# Patient Record
Sex: MALE | Race: WHITE | Employment: UNEMPLOYED | URBAN - METROPOLITAN AREA
[De-identification: names, ages, dates, MRNs, and addresses within clinical notes are randomized per-mention and may not be internally consistent; named-entity substitution may affect disease eponyms.]

---

## 2017-01-14 ENCOUNTER — OFFICE VISIT (OUTPATIENT)
Dept: INTERNAL MEDICINE CLINIC | Facility: CLINIC | Age: 52
End: 2017-01-14

## 2017-01-14 ENCOUNTER — LAB ENCOUNTER (OUTPATIENT)
Dept: LAB | Age: 52
End: 2017-01-14
Attending: FAMILY MEDICINE
Payer: COMMERCIAL

## 2017-01-14 VITALS
BODY MASS INDEX: 26.86 KG/M2 | HEIGHT: 75 IN | SYSTOLIC BLOOD PRESSURE: 136 MMHG | HEART RATE: 82 BPM | RESPIRATION RATE: 16 BRPM | WEIGHT: 216 LBS | TEMPERATURE: 98 F | OXYGEN SATURATION: 97 % | DIASTOLIC BLOOD PRESSURE: 88 MMHG

## 2017-01-14 DIAGNOSIS — I10 ESSENTIAL HYPERTENSION, BENIGN: Primary | ICD-10-CM

## 2017-01-14 DIAGNOSIS — N40.0 BENIGN PROSTATIC HYPERPLASIA WITHOUT LOWER URINARY TRACT SYMPTOMS, UNSPECIFIED MORPHOLOGY: ICD-10-CM

## 2017-01-14 DIAGNOSIS — I10 ESSENTIAL HYPERTENSION, BENIGN: ICD-10-CM

## 2017-01-14 DIAGNOSIS — E78.00 PURE HYPERCHOLESTEROLEMIA: ICD-10-CM

## 2017-01-14 LAB
ALBUMIN SERPL-MCNC: 4.3 G/DL (ref 3.5–4.8)
ALP LIVER SERPL-CCNC: 56 U/L (ref 45–117)
ALT SERPL-CCNC: 44 U/L (ref 17–63)
AST SERPL-CCNC: 26 U/L (ref 15–41)
BASOPHILS # BLD AUTO: 0.02 X10(3) UL (ref 0–0.1)
BASOPHILS NFR BLD AUTO: 0.4 %
BILIRUB SERPL-MCNC: 0.5 MG/DL (ref 0.1–2)
BILIRUB UR QL STRIP.AUTO: NEGATIVE
BUN BLD-MCNC: 15 MG/DL (ref 8–20)
CALCIUM BLD-MCNC: 9.1 MG/DL (ref 8.3–10.3)
CHLORIDE: 106 MMOL/L (ref 101–111)
CHOLEST SMN-MCNC: 233 MG/DL (ref ?–200)
CO2: 26 MMOL/L (ref 22–32)
CREAT BLD-MCNC: 0.95 MG/DL (ref 0.7–1.3)
EOSINOPHIL # BLD AUTO: 0.19 X10(3) UL (ref 0–0.3)
EOSINOPHIL NFR BLD AUTO: 3.9 %
ERYTHROCYTE [DISTWIDTH] IN BLOOD BY AUTOMATED COUNT: 13.6 % (ref 11.5–16)
GLUCOSE BLD-MCNC: 92 MG/DL (ref 70–99)
GLUCOSE UR STRIP.AUTO-MCNC: NEGATIVE MG/DL
HCT VFR BLD AUTO: 42.9 % (ref 37–53)
HDLC SERPL-MCNC: 50 MG/DL (ref 45–?)
HDLC SERPL: 4.66 {RATIO} (ref ?–4.97)
HGB BLD-MCNC: 13.9 G/DL (ref 13–17)
IMMATURE GRANULOCYTE COUNT: 0.04 X10(3) UL (ref 0–1)
IMMATURE GRANULOCYTE RATIO %: 0.8 %
KETONES UR STRIP.AUTO-MCNC: NEGATIVE MG/DL
LDLC SERPL CALC-MCNC: 150 MG/DL (ref ?–130)
LEUKOCYTE ESTERASE UR QL STRIP.AUTO: NEGATIVE
LYMPHOCYTES # BLD AUTO: 1.71 X10(3) UL (ref 0.9–4)
LYMPHOCYTES NFR BLD AUTO: 35.3 %
M PROTEIN MFR SERPL ELPH: 7.8 G/DL (ref 6.1–8.3)
MCH RBC QN AUTO: 28.4 PG (ref 27–33.2)
MCHC RBC AUTO-ENTMCNC: 32.4 G/DL (ref 31–37)
MCV RBC AUTO: 87.7 FL (ref 80–99)
MONOCYTES # BLD AUTO: 0.62 X10(3) UL (ref 0.1–0.6)
MONOCYTES NFR BLD AUTO: 12.8 %
NEUTROPHIL ABS PRELIM: 2.26 X10 (3) UL (ref 1.3–6.7)
NEUTROPHILS # BLD AUTO: 2.26 X10(3) UL (ref 1.3–6.7)
NEUTROPHILS NFR BLD AUTO: 46.8 %
NITRITE UR QL STRIP.AUTO: NEGATIVE
NONHDLC SERPL-MCNC: 183 MG/DL (ref ?–130)
PH UR STRIP.AUTO: 5 [PH] (ref 4.5–8)
PLATELET # BLD AUTO: 186 10(3)UL (ref 150–450)
POTASSIUM SERPL-SCNC: 3.8 MMOL/L (ref 3.6–5.1)
PROT UR STRIP.AUTO-MCNC: NEGATIVE MG/DL
PSA SERPL-MCNC: 0.43 NG/ML (ref 0.01–4)
RBC # BLD AUTO: 4.89 X10(6)UL (ref 4.3–5.7)
RBC UR QL AUTO: NEGATIVE
RED CELL DISTRIBUTION WIDTH-SD: 43.8 FL (ref 35.1–46.3)
SODIUM SERPL-SCNC: 140 MMOL/L (ref 136–144)
SP GR UR STRIP.AUTO: 1.02 (ref 1–1.03)
TRIGLYCERIDES: 166 MG/DL (ref ?–150)
UROBILINOGEN UR STRIP.AUTO-MCNC: <2 MG/DL
VLDL: 33 MG/DL (ref 5–40)
WBC # BLD AUTO: 4.8 X10(3) UL (ref 4–13)

## 2017-01-14 PROCEDURE — 36415 COLL VENOUS BLD VENIPUNCTURE: CPT

## 2017-01-14 PROCEDURE — 80061 LIPID PANEL: CPT

## 2017-01-14 PROCEDURE — 85025 COMPLETE CBC W/AUTO DIFF WBC: CPT

## 2017-01-14 PROCEDURE — 80053 COMPREHEN METABOLIC PANEL: CPT

## 2017-01-14 PROCEDURE — 99214 OFFICE O/P EST MOD 30 MIN: CPT | Performed by: FAMILY MEDICINE

## 2017-01-14 PROCEDURE — 84153 ASSAY OF PSA TOTAL: CPT

## 2017-01-14 PROCEDURE — 81003 URINALYSIS AUTO W/O SCOPE: CPT

## 2017-01-14 RX ORDER — METOPROLOL TARTRATE 50 MG/1
50 TABLET, FILM COATED ORAL 2 TIMES DAILY
Qty: 180 TABLET | Refills: 1 | Status: SHIPPED | OUTPATIENT
Start: 2017-01-14 | End: 2017-10-04

## 2017-01-14 RX ORDER — HYDROCHLOROTHIAZIDE 12.5 MG/1
12.5 TABLET ORAL DAILY
Qty: 30 TABLET | Refills: 0 | Status: SHIPPED | OUTPATIENT
Start: 2017-01-14 | End: 2017-02-04

## 2017-01-14 RX ORDER — AMLODIPINE BESYLATE 5 MG/1
5 TABLET ORAL
Qty: 90 TABLET | Refills: 1 | Status: SHIPPED | OUTPATIENT
Start: 2017-01-14 | End: 2017-08-21

## 2017-01-14 NOTE — PROGRESS NOTES
HPI:    Patient ID: Sharda De Santiago is a 46year old male. HPI  Sharda De Santiago is a 46year old male. HPI:   Patient presents for recheck of his hypertension.  Pt has been taking medications as instructed, no medication side effects, home BP monitoring in t Smoking Status: Former Smoker                   Packs/Day: 0.00  Years:           Quit date: 01/01/2005    Smokeless Status: Never Used                        Alcohol Use: Yes           0.0 oz/week       0 Standard drinks or equivalent per week       Comme W/DIFF  COMP METABOLIC PANEL  LIPID PANEL  PSA  Urinalysis, Routine    Meds This Visit:  Signed Prescriptions Disp Refills    hydrochlorothiazide 12.5 MG Oral Tab 30 tablet 0      Sig: Take 1 tablet (12.5 mg total) by mouth daily.       AmLODIPine Besylate

## 2017-01-25 ENCOUNTER — TELEPHONE (OUTPATIENT)
Dept: INTERNAL MEDICINE CLINIC | Facility: CLINIC | Age: 52
End: 2017-01-25

## 2017-01-25 DIAGNOSIS — E78.00 PURE HYPERCHOLESTEROLEMIA: ICD-10-CM

## 2017-01-25 RX ORDER — PRAVASTATIN SODIUM 20 MG
20 TABLET ORAL NIGHTLY
Qty: 30 TABLET | Refills: 3 | Status: SHIPPED | OUTPATIENT
Start: 2017-01-25 | End: 2017-02-04

## 2017-02-04 ENCOUNTER — OFFICE VISIT (OUTPATIENT)
Dept: INTERNAL MEDICINE CLINIC | Facility: CLINIC | Age: 52
End: 2017-02-04

## 2017-02-04 VITALS
BODY MASS INDEX: 26.61 KG/M2 | HEIGHT: 75 IN | DIASTOLIC BLOOD PRESSURE: 82 MMHG | HEART RATE: 88 BPM | SYSTOLIC BLOOD PRESSURE: 134 MMHG | WEIGHT: 214 LBS | RESPIRATION RATE: 16 BRPM | TEMPERATURE: 98 F

## 2017-02-04 DIAGNOSIS — E78.00 PURE HYPERCHOLESTEROLEMIA: ICD-10-CM

## 2017-02-04 DIAGNOSIS — I10 ESSENTIAL HYPERTENSION, BENIGN: Primary | ICD-10-CM

## 2017-02-04 PROCEDURE — 99213 OFFICE O/P EST LOW 20 MIN: CPT | Performed by: FAMILY MEDICINE

## 2017-02-04 RX ORDER — HYDROCHLOROTHIAZIDE 12.5 MG/1
12.5 TABLET ORAL DAILY
Qty: 90 TABLET | Refills: 1 | Status: SHIPPED | OUTPATIENT
Start: 2017-02-04 | End: 2017-10-11

## 2017-02-04 RX ORDER — PRAVASTATIN SODIUM 20 MG
20 TABLET ORAL NIGHTLY
Qty: 30 TABLET | Refills: 3 | Status: SHIPPED | OUTPATIENT
Start: 2017-02-04 | End: 2017-10-11 | Stop reason: SINTOL

## 2017-02-04 NOTE — PROGRESS NOTES
HPI:    Patient ID: Soniya Espinosa is a 46year old male. HPI  Soniya Espinosa is a 46year old male.   HPI:   Here for med ck   Had labs last month  Is on the water pill but the [facility] was where the chol med was sent    Never picked it up     Using wifes 10 murmur  EXTREMITIES: no edema    ASSESSMENT AND PLAN:   (I10) Essential hypertension, benign  (primary encounter diagnosis)  Plan: appears stable   CPM    (E78.00) Pure hypercholesterolemia  Plan: discussed recent labs   CPM w pravastatin for now  Iris in

## 2017-08-22 RX ORDER — AMLODIPINE BESYLATE 5 MG/1
TABLET ORAL
Qty: 30 TABLET | Refills: 0 | Status: SHIPPED | OUTPATIENT
Start: 2017-08-22 | End: 2017-10-04

## 2017-10-04 ENCOUNTER — TELEPHONE (OUTPATIENT)
Dept: INTERNAL MEDICINE CLINIC | Facility: CLINIC | Age: 52
End: 2017-10-04

## 2017-10-04 RX ORDER — METOPROLOL TARTRATE 50 MG/1
TABLET, FILM COATED ORAL
Qty: 60 TABLET | Refills: 0 | Status: SHIPPED | OUTPATIENT
Start: 2017-10-04 | End: 2017-10-11

## 2017-10-04 RX ORDER — AMLODIPINE BESYLATE 5 MG/1
5 TABLET ORAL
Qty: 30 TABLET | Refills: 0 | Status: SHIPPED | OUTPATIENT
Start: 2017-10-04 | End: 2017-10-11

## 2017-10-04 NOTE — TELEPHONE ENCOUNTER
Patient requesting a refill on his Amlod[facility]ine through [facility].  Has a medication visit appointment 10/11/2017 but will be all out of before then  Call patient

## 2017-10-11 ENCOUNTER — OFFICE VISIT (OUTPATIENT)
Dept: INTERNAL MEDICINE CLINIC | Facility: CLINIC | Age: 52
End: 2017-10-11

## 2017-10-11 ENCOUNTER — APPOINTMENT (OUTPATIENT)
Dept: LAB | Age: 52
End: 2017-10-11
Attending: FAMILY MEDICINE
Payer: COMMERCIAL

## 2017-10-11 VITALS
SYSTOLIC BLOOD PRESSURE: 130 MMHG | RESPIRATION RATE: 16 BRPM | DIASTOLIC BLOOD PRESSURE: 86 MMHG | OXYGEN SATURATION: 97 % | BODY MASS INDEX: 27 KG/M2 | HEART RATE: 81 BPM | TEMPERATURE: 99 F | WEIGHT: 213.75 LBS

## 2017-10-11 DIAGNOSIS — Z11.59 NEED FOR HEPATITIS C SCREENING TEST: ICD-10-CM

## 2017-10-11 DIAGNOSIS — E78.00 PURE HYPERCHOLESTEROLEMIA: ICD-10-CM

## 2017-10-11 DIAGNOSIS — I10 ESSENTIAL HYPERTENSION, BENIGN: Primary | ICD-10-CM

## 2017-10-11 PROCEDURE — 86803 HEPATITIS C AB TEST: CPT

## 2017-10-11 PROCEDURE — 36415 COLL VENOUS BLD VENIPUNCTURE: CPT

## 2017-10-11 PROCEDURE — 80061 LIPID PANEL: CPT

## 2017-10-11 PROCEDURE — 80053 COMPREHEN METABOLIC PANEL: CPT

## 2017-10-11 PROCEDURE — 99213 OFFICE O/P EST LOW 20 MIN: CPT | Performed by: FAMILY MEDICINE

## 2017-10-11 RX ORDER — AMLODIPINE BESYLATE 5 MG/1
5 TABLET ORAL
Qty: 90 TABLET | Refills: 1 | Status: SHIPPED | OUTPATIENT
Start: 2017-10-11 | End: 2018-04-24

## 2017-10-11 RX ORDER — METOPROLOL TARTRATE 50 MG/1
50 TABLET, FILM COATED ORAL 2 TIMES DAILY
Qty: 180 TABLET | Refills: 1 | Status: SHIPPED | OUTPATIENT
Start: 2017-10-11 | End: 2018-04-24

## 2017-10-11 RX ORDER — CHLORAL HYDRATE 500 MG
1000 CAPSULE ORAL DAILY
COMMUNITY

## 2017-10-11 NOTE — PROGRESS NOTES
HPI:    Patient ID: Santhosh Desouza is a 46year old male. HPI  Santhosh Desouza is a 46year old male. HPI:   Patient presents for recheck of his hypertension.  Pt has been taking medications as instructed, no medication side effects, home BP monitoring in t History reviewed. No pertinent surgical history.    Social History:    Smoking status: Former Smoker                                                              Packs/day: 0.00      Years: 0.00         Quit date: 1/1/2005  Smokeless tobacco: Never Used this encounter.       Meds This Visit:  No prescriptions requested or ordered in this encounter       Imaging & Referrals:  None       #[id number]

## 2018-04-05 ENCOUNTER — TELEPHONE (OUTPATIENT)
Dept: INTERNAL MEDICINE CLINIC | Facility: CLINIC | Age: 53
End: 2018-04-05

## 2018-04-05 ENCOUNTER — PATIENT OUTREACH (OUTPATIENT)
Dept: INTERNAL MEDICINE CLINIC | Facility: CLINIC | Age: 53
End: 2018-04-05

## 2018-04-24 RX ORDER — AMLODIPINE BESYLATE 5 MG/1
5 TABLET ORAL DAILY
Qty: 30 TABLET | Refills: 0 | Status: SHIPPED | OUTPATIENT
Start: 2018-04-24 | End: 2018-06-05

## 2018-04-24 RX ORDER — METOPROLOL TARTRATE 50 MG/1
50 TABLET, FILM COATED ORAL 2 TIMES DAILY
Qty: 60 TABLET | Refills: 0 | Status: SHIPPED | OUTPATIENT
Start: 2018-04-24 | End: 2018-06-05

## 2018-04-24 NOTE — TELEPHONE ENCOUNTER
Refill requested: Amlodipine 5 mg and  Metoprolol  50 mg     Failed protocol      Last refill: 10/11/17 #90 day supply with 1 refill   Relevant Labs: NA   BP Readings from Last 3 Encounters:  10/11/17 : 130/86  02/04/17 : 134/82  01/14/17 : 136/88      Last OV / RTC advised: 10/11/17 RTC 6 months   Appt Scheduled: No (Due April 2018)

## 2018-06-05 RX ORDER — AMLODIPINE BESYLATE 5 MG/1
5 TABLET ORAL DAILY
Qty: 30 TABLET | Refills: 0 | Status: SHIPPED | OUTPATIENT
Start: 2018-06-05 | End: 2018-06-07

## 2018-06-05 RX ORDER — METOPROLOL TARTRATE 50 MG/1
50 TABLET, FILM COATED ORAL 2 TIMES DAILY
Qty: 60 TABLET | Refills: 0 | Status: SHIPPED | OUTPATIENT
Start: 2018-06-05 | End: 2018-06-07

## 2018-06-05 NOTE — TELEPHONE ENCOUNTER
Metoprolol Tartrate 50 MG Oral Tab & AmLODIPine Besylate 5 MG Oral Tab please refill patient has an appointment 6/7/18 with Dr Meagan Wise for medication follow up patient states he is out of medication

## 2018-06-07 ENCOUNTER — OFFICE VISIT (OUTPATIENT)
Dept: INTERNAL MEDICINE CLINIC | Facility: CLINIC | Age: 53
End: 2018-06-07

## 2018-06-07 ENCOUNTER — APPOINTMENT (OUTPATIENT)
Dept: LAB | Age: 53
End: 2018-06-07
Attending: FAMILY MEDICINE
Payer: COMMERCIAL

## 2018-06-07 VITALS
OXYGEN SATURATION: 97 % | WEIGHT: 219 LBS | BODY MASS INDEX: 27 KG/M2 | RESPIRATION RATE: 16 BRPM | TEMPERATURE: 99 F | HEART RATE: 78 BPM | SYSTOLIC BLOOD PRESSURE: 130 MMHG | DIASTOLIC BLOOD PRESSURE: 85 MMHG

## 2018-06-07 DIAGNOSIS — E78.00 PURE HYPERCHOLESTEROLEMIA: ICD-10-CM

## 2018-06-07 DIAGNOSIS — I10 ESSENTIAL HYPERTENSION, BENIGN: ICD-10-CM

## 2018-06-07 DIAGNOSIS — Z12.11 COLON CANCER SCREENING: ICD-10-CM

## 2018-06-07 DIAGNOSIS — I10 ESSENTIAL HYPERTENSION, BENIGN: Primary | ICD-10-CM

## 2018-06-07 PROCEDURE — 80061 LIPID PANEL: CPT

## 2018-06-07 PROCEDURE — 36415 COLL VENOUS BLD VENIPUNCTURE: CPT

## 2018-06-07 PROCEDURE — 80053 COMPREHEN METABOLIC PANEL: CPT

## 2018-06-07 PROCEDURE — 99214 OFFICE O/P EST MOD 30 MIN: CPT | Performed by: FAMILY MEDICINE

## 2018-06-07 RX ORDER — METOPROLOL TARTRATE 50 MG/1
50 TABLET, FILM COATED ORAL 2 TIMES DAILY
Qty: 60 TABLET | Refills: 5 | Status: SHIPPED | OUTPATIENT
Start: 2018-06-07 | End: 2018-08-16

## 2018-06-07 RX ORDER — AMLODIPINE BESYLATE 5 MG/1
5 TABLET ORAL DAILY
Qty: 30 TABLET | Refills: 5 | Status: SHIPPED | OUTPATIENT
Start: 2018-06-07 | End: 2018-12-14

## 2018-06-07 RX ORDER — AMLODIPINE BESYLATE 5 MG/1
TABLET ORAL
Qty: 30 TABLET | Refills: 0 | Status: SHIPPED | OUTPATIENT
Start: 2018-06-07 | End: 2018-06-07

## 2018-06-07 NOTE — PROGRESS NOTES
HPI:    Patient ID: Negar Cade is a 48year old male. HPI  Negar Cade is a 48year old male. HPI:   Patient presents for recheck of his hypertension.  Pt has been taking medications as instructed, no medication side effects, home BP monitoring in t Diagnosis Date   • Hepatitis B 1989   • Laboratory exam ordered as part of routine general medical examination 4/3/2013   • Other and unspecified hyperlipidemia 2005   • Unspecified essential hypertension 2005      No past surgical history on file.    Soc daily. Disp:  Rfl:      Allergies:  Crestor [Rosuvastat*    MYALGIA  Lipitor [Atorvastat*        Comment:Myalgia   PHYSICAL EXAM:   Physical Exam           ASSESSMENT/PLAN:   Essential hypertension, benign  (primary encounter diagnosis)  Pure hypercholeste

## 2018-06-11 DIAGNOSIS — E78.00 PURE HYPERCHOLESTEROLEMIA: Primary | ICD-10-CM

## 2018-06-11 RX ORDER — EZETIMIBE 10 MG/1
10 TABLET ORAL DAILY
Qty: 30 TABLET | Refills: 5 | Status: SHIPPED | OUTPATIENT
Start: 2018-06-11 | End: 2019-06-25

## 2018-07-05 RX ORDER — AMLODIPINE BESYLATE 5 MG/1
5 TABLET ORAL DAILY
Qty: 90 TABLET | Refills: 1 | Status: SHIPPED | OUTPATIENT
Start: 2018-07-05 | End: 2018-12-12

## 2018-07-05 NOTE — TELEPHONE ENCOUNTER
Refill requested: Amlodipine 5 mg     Passed protocol      Last refill: 6/7/18 #30 5R  Relevant Labs: [facility] 6/7/18   BP Readings from Last 3 Encounters:  06/07/18 : 130/85  10/11/17 : 130/86  02/04/17 : 134/82      Last OV / RTC advised: 6/7/18 Dr Lashell Schwartz RTC

## 2018-08-15 ENCOUNTER — TELEPHONE (OUTPATIENT)
Dept: INTERNAL MEDICINE CLINIC | Facility: CLINIC | Age: 53
End: 2018-08-15

## 2018-08-15 RX ORDER — METOPROLOL TARTRATE 50 MG/1
50 TABLET, FILM COATED ORAL 2 TIMES DAILY
Qty: 180 TABLET | Refills: 0 | OUTPATIENT
Start: 2018-08-15

## 2018-08-15 NOTE — TELEPHONE ENCOUNTER
Refill requested: Metoprolol 50 mg     Passed protocol      Last refill: 6/7/18 #60 [facility]R [facility]  Declined rx to pharmacy - Should still have refills on file.

## 2018-08-15 NOTE — TELEPHONE ENCOUNTER
[facility] is calling to speak with a nurse in regards to the prescription for Metoprolol Tartrate 50 MG Oral Tab 60 tablets for 5 refills. She stated that it needs to be changed to a 90 day supply.  She stated that it's more cost effective for the patien

## 2018-08-16 RX ORDER — METOPROLOL TARTRATE 50 MG/1
50 TABLET, FILM COATED ORAL 2 TIMES DAILY
Qty: 180 TABLET | Refills: 0 | Status: SHIPPED | OUTPATIENT
Start: 2018-08-16 | End: 2018-09-27

## 2018-09-27 RX ORDER — METOPROLOL TARTRATE 50 MG/1
TABLET, FILM COATED ORAL
Qty: 180 TABLET | Refills: 0 | Status: SHIPPED | OUTPATIENT
Start: 2018-09-27 | End: 2018-12-14

## 2018-12-12 RX ORDER — AMLODIPINE BESYLATE 5 MG/1
TABLET ORAL
Qty: 90 TABLET | Refills: 0 | Status: SHIPPED | OUTPATIENT
Start: 2018-12-12 | End: 2018-12-14

## 2018-12-14 ENCOUNTER — OFFICE VISIT (OUTPATIENT)
Dept: INTERNAL MEDICINE CLINIC | Facility: CLINIC | Age: 53
End: 2018-12-14

## 2018-12-14 ENCOUNTER — APPOINTMENT (OUTPATIENT)
Dept: LAB | Age: 53
End: 2018-12-14
Attending: FAMILY MEDICINE
Payer: COMMERCIAL

## 2018-12-14 VITALS
BODY MASS INDEX: 26.98 KG/M2 | WEIGHT: 217 LBS | RESPIRATION RATE: 16 BRPM | DIASTOLIC BLOOD PRESSURE: 84 MMHG | HEIGHT: 75 IN | TEMPERATURE: 98 F | SYSTOLIC BLOOD PRESSURE: 124 MMHG | HEART RATE: 93 BPM | OXYGEN SATURATION: 97 %

## 2018-12-14 DIAGNOSIS — E78.00 PURE HYPERCHOLESTEROLEMIA: ICD-10-CM

## 2018-12-14 DIAGNOSIS — I10 ESSENTIAL HYPERTENSION, BENIGN: Primary | ICD-10-CM

## 2018-12-14 DIAGNOSIS — L72.3 SEBACEOUS CYST: ICD-10-CM

## 2018-12-14 DIAGNOSIS — I10 ESSENTIAL HYPERTENSION, BENIGN: ICD-10-CM

## 2018-12-14 PROCEDURE — 99213 OFFICE O/P EST LOW 20 MIN: CPT | Performed by: FAMILY MEDICINE

## 2018-12-14 PROCEDURE — 80061 LIPID PANEL: CPT

## 2018-12-14 PROCEDURE — 80053 COMPREHEN METABOLIC PANEL: CPT

## 2018-12-14 PROCEDURE — 36415 COLL VENOUS BLD VENIPUNCTURE: CPT

## 2018-12-14 RX ORDER — AMLODIPINE BESYLATE 5 MG/1
5 TABLET ORAL DAILY
Qty: 90 TABLET | Refills: 1 | Status: SHIPPED | OUTPATIENT
Start: 2018-12-14 | End: 2019-09-19

## 2018-12-14 RX ORDER — METOPROLOL TARTRATE 50 MG/1
50 TABLET, FILM COATED ORAL 2 TIMES DAILY
Qty: 180 TABLET | Refills: 1 | Status: SHIPPED | OUTPATIENT
Start: 2018-12-14 | End: 2019-09-19

## 2018-12-14 NOTE — PROGRESS NOTES
HPI:    Patient ID: Vickey Saldivar is a 48year old male. HPI  Vickey Saldivar is a 48year old male. HPI:   Patient presents for recheck of his hypertension.  Pt has been taking medications as instructed, no medication side effects, home BP monitoring in t tablet Rfl: 5      Past Medical History:   Diagnosis Date   • Hepatitis B 1989   • Laboratory exam ordered as part of routine general medical examination 4/3/2013   • Other and unspecified hyperlipidemia 2005   • Unspecified essential hypertension 2005 total) by mouth daily. Disp: 30 tablet Rfl: 5     Allergies:  Crestor [Rosuvastat*    MYALGIA  Lipitor [Atorvastat*        Comment:Myalgia   PHYSICAL EXAM:   Physical Exam           ASSESSMENT/PLAN:   No diagnosis found.     No orders of the defined types w

## 2019-01-11 ENCOUNTER — OFFICE VISIT (OUTPATIENT)
Dept: SURGERY | Facility: CLINIC | Age: 54
End: 2019-01-11

## 2019-01-11 VITALS
BODY MASS INDEX: 26.73 KG/M2 | DIASTOLIC BLOOD PRESSURE: 74 MMHG | RESPIRATION RATE: 18 BRPM | HEART RATE: 67 BPM | SYSTOLIC BLOOD PRESSURE: 111 MMHG | HEIGHT: 75 IN | WEIGHT: 215 LBS

## 2019-01-11 DIAGNOSIS — Z01.818 PRE-OP TESTING: ICD-10-CM

## 2019-01-11 DIAGNOSIS — L72.9 SCALP CYST: Primary | ICD-10-CM

## 2019-01-11 PROCEDURE — 99243 OFF/OP CNSLTJ NEW/EST LOW 30: CPT | Performed by: SURGERY

## 2019-01-11 NOTE — H&P
New Patient Visit Note       Active Problems      1. Scalp cyst    2. Pre-op testing        Chief Complaint   Patient presents with:  Mass: NW PT ref by PCP for cyst on scalp. PT states that he has had cyst for 20+yrs and has grown in size.  PT denies PN, n daily. Please contact our office to schedule your overdue follow up appt. Disp: 90 tablet Rfl: 1   Metoprolol Tartrate 50 MG Oral Tab Take 1 tablet (50 mg total) by mouth 2 (two) times daily.  Disp: 180 tablet Rfl: 1   ezetimibe (ZETIA) 10 MG Oral Tab Take encounter diagnosis)  Pre-op testing    Pt with multiple scalp cysts. Plan     · Etiology and treatment options for scalp cysts discussed with the patient and his wife.   · Plan for an excision of three scalp cysts under anesthesia at the [facility]

## 2019-01-12 ENCOUNTER — APPOINTMENT (OUTPATIENT)
Dept: LAB | Facility: HOSPITAL | Age: 54
End: 2019-01-12
Attending: SURGERY
Payer: COMMERCIAL

## 2019-01-12 DIAGNOSIS — Z01.818 PRE-OP TESTING: ICD-10-CM

## 2019-01-12 LAB
ALBUMIN SERPL-MCNC: 4 G/DL (ref 3.1–4.5)
ALBUMIN/GLOB SERPL: 1.1 {RATIO} (ref 1–2)
ALP LIVER SERPL-CCNC: 56 U/L (ref 45–117)
ALT SERPL-CCNC: 37 U/L (ref 17–63)
ANION GAP SERPL CALC-SCNC: 8 MMOL/L (ref 0–18)
AST SERPL-CCNC: 18 U/L (ref 15–41)
ATRIAL RATE: 79 BPM
BILIRUB SERPL-MCNC: 0.4 MG/DL (ref 0.1–2)
BUN BLD-MCNC: 12 MG/DL (ref 8–20)
BUN/CREAT SERPL: 13.6 (ref 10–20)
CALCIUM BLD-MCNC: 8.9 MG/DL (ref 8.3–10.3)
CHLORIDE SERPL-SCNC: 107 MMOL/L (ref 101–111)
CO2 SERPL-SCNC: 25 MMOL/L (ref 22–32)
CREAT BLD-MCNC: 0.88 MG/DL (ref 0.7–1.3)
GLOBULIN PLAS-MCNC: 3.7 G/DL (ref 2.8–4.4)
GLUCOSE BLD-MCNC: 106 MG/DL (ref 70–99)
M PROTEIN MFR SERPL ELPH: 7.7 G/DL (ref 6.4–8.2)
OSMOLALITY SERPL CALC.SUM OF ELEC: 290 MOSM/KG (ref 275–295)
P AXIS: 26 DEGREES
P-R INTERVAL: 218 MS
POTASSIUM SERPL-SCNC: 4 MMOL/L (ref 3.6–5.1)
Q-T INTERVAL: 406 MS
QRS DURATION: 86 MS
QTC CALCULATION (BEZET): 465 MS
R AXIS: -13 DEGREES
SODIUM SERPL-SCNC: 140 MMOL/L (ref 136–144)
T AXIS: 28 DEGREES
VENTRICULAR RATE: 79 BPM

## 2019-01-12 PROCEDURE — 93010 ELECTROCARDIOGRAM REPORT: CPT | Performed by: INTERNAL MEDICINE

## 2019-01-12 PROCEDURE — 36415 COLL VENOUS BLD VENIPUNCTURE: CPT

## 2019-01-12 PROCEDURE — 80053 COMPREHEN METABOLIC PANEL: CPT

## 2019-01-12 PROCEDURE — 93005 ELECTROCARDIOGRAM TRACING: CPT

## 2019-01-18 PROCEDURE — 88304 TISSUE EXAM BY PATHOLOGIST: CPT | Performed by: SURGERY

## 2019-02-01 ENCOUNTER — OFFICE VISIT (OUTPATIENT)
Dept: SURGERY | Facility: CLINIC | Age: 54
End: 2019-02-01

## 2019-02-01 VITALS
RESPIRATION RATE: 18 BRPM | WEIGHT: 215 LBS | HEIGHT: 75 IN | BODY MASS INDEX: 26.73 KG/M2 | TEMPERATURE: 98 F | DIASTOLIC BLOOD PRESSURE: 82 MMHG | HEART RATE: 78 BPM | SYSTOLIC BLOOD PRESSURE: 123 MMHG

## 2019-02-01 DIAGNOSIS — L72.9 SCALP CYST: Primary | ICD-10-CM

## 2019-02-01 PROCEDURE — 99024 POSTOP FOLLOW-UP VISIT: CPT | Performed by: SURGERY

## 2019-02-01 NOTE — PROGRESS NOTES
Post Operative Visit Note       Active Problems  1. Scalp cyst         Chief Complaint   Patient presents with:  Post-Op: post op stiches removed- Pt here for stiches to be removed on scalp. Pt denies fever, chills, pain, redness  and drainage.          His appt., Disp: 90 tablet, Rfl: 1  •  Metoprolol Tartrate 50 MG Oral Tab, Take 1 tablet (50 mg total) by mouth 2 (two) times daily. , Disp: 180 tablet, Rfl: 1  •  ezetimibe (ZETIA) 10 MG Oral Tab, Take 1 tablet (10 mg total) by mouth daily. , Disp: 30 tablet, R Follow-up on file.     Chad Luna MD

## 2019-05-17 ENCOUNTER — TELEPHONE (OUTPATIENT)
Dept: INTERNAL MEDICINE CLINIC | Facility: CLINIC | Age: 54
End: 2019-05-17

## 2019-05-17 DIAGNOSIS — Z12.11 ENCOUNTER FOR SCREENING COLONOSCOPY: Primary | ICD-10-CM

## 2019-06-25 DIAGNOSIS — E78.00 PURE HYPERCHOLESTEROLEMIA: ICD-10-CM

## 2019-06-26 RX ORDER — EZETIMIBE 10 MG/1
TABLET ORAL
Qty: 30 TABLET | Refills: 1 | Status: SHIPPED | OUTPATIENT
Start: 2019-06-26 | End: 2019-09-22

## 2019-09-19 DIAGNOSIS — I10 ESSENTIAL HYPERTENSION, BENIGN: ICD-10-CM

## 2019-09-19 DIAGNOSIS — E78.00 PURE HYPERCHOLESTEROLEMIA: ICD-10-CM

## 2019-09-19 DIAGNOSIS — Z00.00 ROUTINE GENERAL MEDICAL EXAMINATION AT A HEALTH CARE FACILITY: Primary | ICD-10-CM

## 2019-09-19 RX ORDER — METOPROLOL TARTRATE 50 MG/1
TABLET, FILM COATED ORAL
Qty: 180 TABLET | Refills: 0 | Status: SHIPPED | OUTPATIENT
Start: 2019-09-19 | End: 2020-01-17

## 2019-09-19 RX ORDER — AMLODIPINE BESYLATE 5 MG/1
TABLET ORAL
Qty: 90 TABLET | Refills: 0 | Status: SHIPPED | OUTPATIENT
Start: 2019-09-19 | End: 2019-12-23

## 2019-09-19 NOTE — TELEPHONE ENCOUNTER
Pt states he can only come in to see Kwan Ortiz and has to be on a Saturday. First availability was October 26. Pt is aware he will have to do lab work prior to coming in since lab is closed Saturdays. He stated he will see when he can come in then.  Refilled

## 2019-09-22 DIAGNOSIS — E78.00 PURE HYPERCHOLESTEROLEMIA: ICD-10-CM

## 2019-09-23 RX ORDER — EZETIMIBE 10 MG/1
TABLET ORAL
Qty: 30 TABLET | Refills: 0 | Status: SHIPPED | OUTPATIENT
Start: 2019-09-23 | End: 2019-10-23

## 2019-09-23 NOTE — TELEPHONE ENCOUNTER
Last OV: 12/14/18 with Dr. Kelvin Ramirez  Last refill date: 6/26/19     #/refills: #30, 1 refill  When pt was asked to return for OV: 6 months  Upcoming appt: 10/26/19 with Dr. Kelvin Ramirez  Last labs 1/12/19 (CMP)

## 2019-10-23 DIAGNOSIS — E78.00 PURE HYPERCHOLESTEROLEMIA: ICD-10-CM

## 2019-10-23 RX ORDER — EZETIMIBE 10 MG/1
TABLET ORAL
Qty: 30 TABLET | Refills: 0 | Status: SHIPPED | OUTPATIENT
Start: 2019-10-23 | End: 2019-10-26

## 2019-10-23 NOTE — TELEPHONE ENCOUNTER
Ezetimibe 10 MG  Last OV relevant to medication: 12-14-18  Last refill date: 9-23-19 #/refills: 0  When pt was asked to return for OV: 6 mo.   Upcoming appt/reason: 10-26-19  Recent labs: 12-14-18: Lipid

## 2019-10-26 ENCOUNTER — OFFICE VISIT (OUTPATIENT)
Dept: INTERNAL MEDICINE CLINIC | Facility: CLINIC | Age: 54
End: 2019-10-26

## 2019-10-26 VITALS
DIASTOLIC BLOOD PRESSURE: 84 MMHG | WEIGHT: 220 LBS | TEMPERATURE: 98 F | OXYGEN SATURATION: 98 % | BODY MASS INDEX: 29.8 KG/M2 | SYSTOLIC BLOOD PRESSURE: 130 MMHG | RESPIRATION RATE: 16 BRPM | HEIGHT: 72 IN | HEART RATE: 80 BPM

## 2019-10-26 DIAGNOSIS — E78.00 PURE HYPERCHOLESTEROLEMIA: ICD-10-CM

## 2019-10-26 DIAGNOSIS — Z00.00 LABORATORY EXAM ORDERED AS PART OF ROUTINE GENERAL MEDICAL EXAMINATION: ICD-10-CM

## 2019-10-26 DIAGNOSIS — Z12.11 COLON CANCER SCREENING: ICD-10-CM

## 2019-10-26 DIAGNOSIS — I10 ESSENTIAL HYPERTENSION, BENIGN: ICD-10-CM

## 2019-10-26 DIAGNOSIS — Z00.00 ROUTINE GENERAL MEDICAL EXAMINATION AT A HEALTH CARE FACILITY: ICD-10-CM

## 2019-10-26 DIAGNOSIS — Z00.00 WELLNESS EXAMINATION: Primary | ICD-10-CM

## 2019-10-26 PROCEDURE — 83036 HEMOGLOBIN GLYCOSYLATED A1C: CPT | Performed by: FAMILY MEDICINE

## 2019-10-26 PROCEDURE — 99396 PREV VISIT EST AGE 40-64: CPT | Performed by: FAMILY MEDICINE

## 2019-10-26 PROCEDURE — 80061 LIPID PANEL: CPT | Performed by: FAMILY MEDICINE

## 2019-10-26 PROCEDURE — 85025 COMPLETE CBC W/AUTO DIFF WBC: CPT | Performed by: FAMILY MEDICINE

## 2019-10-26 PROCEDURE — 80053 COMPREHEN METABOLIC PANEL: CPT | Performed by: FAMILY MEDICINE

## 2019-10-26 RX ORDER — EZETIMIBE 10 MG/1
10 TABLET ORAL
Qty: 90 TABLET | Refills: 1 | Status: SHIPPED | OUTPATIENT
Start: 2019-10-26 | End: 2021-01-23

## 2019-10-26 NOTE — PROGRESS NOTES
HPI:    Patient ID: Jeff Quinones is a 47year old male.     HPI  Jeff Quinones is a 47year old male who presents for a complete physical exam.   HPI:       Wt Readings from Last 6 Encounters:  10/26/19 : 220 lb (99.8 kg)  02/01/19 : 215 lb (97.5 kg)  01/15/19 Procedure Laterality Date   • EXCISION SCALP MASS N/A 1/18/2019    Performed by Kevin Nava MD at [address] [place] [address]   • OTHER SURGICAL HISTORY  2019    cyst scalp      Family History   Problem Relation Age of Onset   • Heart Disorder Other    • Hyperten maintenance, will check fasting Lipids, CMP, CBC and UA. BP ok   Declined flu shot   The patient indicates understanding of these issues and agrees to the plan. The patient is asked to return for CPX in 1yr.     Review of Systems       EZETIMIBE 10 MG Ora

## 2019-12-23 RX ORDER — AMLODIPINE BESYLATE 5 MG/1
TABLET ORAL
Qty: 90 TABLET | Refills: 0 | Status: SHIPPED | OUTPATIENT
Start: 2019-12-23 | End: 2021-01-23

## 2020-01-17 RX ORDER — METOPROLOL TARTRATE 50 MG/1
TABLET, FILM COATED ORAL
Qty: 180 TABLET | Refills: 0 | Status: SHIPPED | OUTPATIENT
Start: 2020-01-17 | End: 2020-04-24

## 2020-01-17 NOTE — TELEPHONE ENCOUNTER
Refill requested:   Requested Prescriptions     Pending Prescriptions Disp Refills   • METOPROLOL TARTRATE 50 MG Oral Tab [Pharmacy Med Name: METOPROLOL TARTRATE 50 MG TAB] 180 tablet 0     Sig: TAKE 1 TABLET BY MOUTH TWICE A DAY     Passed protocol    Las

## 2020-04-23 NOTE — TELEPHONE ENCOUNTER
Pt states he wants to talk to doctor about amlodipine 5 mg. Offered a phone visit and he said no that he will wait to see him in person.      Metoprolol 50 mg failed protocol due to  Hypertension Medications Protocol Failed4/23 4:34 PM   Appointment in past

## 2020-04-24 ENCOUNTER — VIRTUAL PHONE E/M (OUTPATIENT)
Dept: INTERNAL MEDICINE CLINIC | Facility: CLINIC | Age: 55
End: 2020-04-24

## 2020-04-24 DIAGNOSIS — I10 ESSENTIAL HYPERTENSION, BENIGN: Primary | ICD-10-CM

## 2020-04-24 DIAGNOSIS — E78.00 PURE HYPERCHOLESTEROLEMIA: ICD-10-CM

## 2020-04-24 PROCEDURE — 99213 OFFICE O/P EST LOW 20 MIN: CPT | Performed by: FAMILY MEDICINE

## 2020-04-24 RX ORDER — AMLODIPINE BESYLATE 5 MG
5 TABLET ORAL DAILY
Qty: 90 TABLET | Refills: 0 | Status: SHIPPED | OUTPATIENT
Start: 2020-04-24 | End: 2020-07-16

## 2020-04-24 RX ORDER — METOPROLOL TARTRATE 50 MG/1
50 TABLET, FILM COATED ORAL 2 TIMES DAILY
Qty: 180 TABLET | Refills: 0 | Status: SHIPPED | OUTPATIENT
Start: 2020-04-24 | End: 2020-07-16

## 2020-04-24 NOTE — TELEPHONE ENCOUNTER
Called patient again to schedule a TE per  due to not sure when office will re-open for in person visit.  TE scheduled for 4/24/2020 @ 1pm

## 2020-04-24 NOTE — PROGRESS NOTES
Virtual Telephone Check-In    [facility] [place] verbally consents to a Virtual/Telephone Check-In visit on 04/24/20. Patient understands and accepts financial responsibility for any deductible, co-insurance and/or co-pays associated with this service.     Dura visit.  GENERAL: appears fine over the phone       ASSESSMENT AND PLAN:   (I10) Essential hypertension, benign  (primary encounter diagnosis)  Plan: appears stable w his numbers    Not clear why he wants branded norvasc but will order      Ck labs this sum

## 2020-04-28 RX ORDER — METOPROLOL TARTRATE 50 MG/1
TABLET, FILM COATED ORAL
Qty: 180 TABLET | Refills: 0 | OUTPATIENT
Start: 2020-04-28

## 2020-07-16 RX ORDER — AMLODIPINE BESYLATE 5 MG/1
TABLET ORAL
Qty: 30 TABLET | Refills: 0 | Status: SHIPPED | OUTPATIENT
Start: 2020-07-16 | End: 2020-08-17

## 2020-07-16 RX ORDER — METOPROLOL TARTRATE 50 MG/1
TABLET, FILM COATED ORAL
Qty: 60 TABLET | Refills: 0 | Status: SHIPPED | OUTPATIENT
Start: 2020-07-16 | End: 2020-08-17

## 2020-07-16 NOTE — TELEPHONE ENCOUNTER
Spoke with patient and unable to make appointment with him.     Metoprolol 50mg last filled 4/24/20 #180    Amlodipine 5mg last filled 12/23/19 #90    LOV 10/26/19     Last labs 10/26/19

## 2020-08-17 RX ORDER — METOPROLOL TARTRATE 50 MG/1
TABLET, FILM COATED ORAL
Qty: 60 TABLET | Refills: 1 | Status: SHIPPED | OUTPATIENT
Start: 2020-08-17 | End: 2020-09-21

## 2020-08-17 RX ORDER — AMLODIPINE BESYLATE 5 MG/1
TABLET ORAL
Qty: 30 TABLET | Refills: 1 | Status: SHIPPED | OUTPATIENT
Start: 2020-08-17 | End: 2021-01-23

## 2020-09-01 ENCOUNTER — PATIENT OUTREACH (OUTPATIENT)
Dept: INTERNAL MEDICINE CLINIC | Facility: CLINIC | Age: 55
End: 2020-09-01

## 2020-09-01 DIAGNOSIS — Z12.11 SCREENING FOR COLON CANCER: Primary | ICD-10-CM

## 2020-09-01 NOTE — PROGRESS NOTES
Pt is overdue for C-scope, referral placed for GI as last one is . Can also offer pt FIT card which he can  from office and can place order.

## 2020-09-02 NOTE — PROGRESS NOTES
Called patient and informed him of message below. Provided GI contact info. Pt understood. No further questions.

## 2020-09-21 RX ORDER — METOPROLOL TARTRATE 50 MG/1
TABLET, FILM COATED ORAL
Qty: 60 TABLET | Refills: 1 | Status: SHIPPED | OUTPATIENT
Start: 2020-09-21 | End: 2020-10-28

## 2020-09-21 NOTE — TELEPHONE ENCOUNTER
Called pt and scheduled appt for cpx for 10/10     ** Please sign labs**      METOPROLOL TARTRATE 50 MG TAB  Protocol Failed     LOV: 4/24/2020   RTC: none noted   Upcoming OV:  10/10/2020   Filled:  8/17/2020 #60 1 refill   Recent labs: [facility] 10/26/2019

## 2020-10-10 DIAGNOSIS — Z00.00 ROUTINE GENERAL MEDICAL EXAMINATION AT A HEALTH CARE FACILITY: Primary | ICD-10-CM

## 2020-10-10 DIAGNOSIS — Z12.5 PROSTATE CANCER SCREENING: ICD-10-CM

## 2020-10-28 RX ORDER — METOPROLOL TARTRATE 50 MG/1
TABLET, FILM COATED ORAL
Qty: 60 TABLET | Refills: 1 | Status: SHIPPED | OUTPATIENT
Start: 2020-10-28 | End: 2021-01-08

## 2020-10-31 ENCOUNTER — TELEPHONE (OUTPATIENT)
Dept: INTERNAL MEDICINE CLINIC | Facility: CLINIC | Age: 55
End: 2020-10-31

## 2020-11-30 RX ORDER — METOPROLOL TARTRATE 50 MG/1
TABLET, FILM COATED ORAL
Qty: 60 TABLET | Refills: 1 | OUTPATIENT
Start: 2020-11-30

## 2020-12-09 NOTE — TELEPHONE ENCOUNTER
Pt answered and then hung up. Pt is due for CPX and labs that were ordered back in October of this year (pt will need to fast 10-12 hours). Metoprolol 50 mg failed protocol due to  Hypertension Medications Protocol Goanxs2912/08/2020 07:58 PM   CMP or BMP in past 12 months Protocol Details    Appointment in past 6 or next 3 months    Filled 10-25-20  Qty 60  1 refill  No upcoming appt. Telemed.  4-24-20

## 2020-12-21 RX ORDER — AMLODIPINE BESYLATE 5 MG/1
TABLET ORAL
Qty: 90 TABLET | Refills: 0 | OUTPATIENT
Start: 2020-12-21

## 2020-12-21 RX ORDER — METOPROLOL TARTRATE 50 MG/1
TABLET, FILM COATED ORAL
Qty: 60 TABLET | Refills: 1 | OUTPATIENT
Start: 2020-12-21

## 2021-01-08 RX ORDER — METOPROLOL TARTRATE 50 MG/1
TABLET, FILM COATED ORAL
Qty: 60 TABLET | Refills: 0 | Status: SHIPPED | OUTPATIENT
Start: 2021-01-08 | End: 2021-01-23

## 2021-01-08 NOTE — TELEPHONE ENCOUNTER
Pt is scheduled for 1- for CPX and will make an appt. For labs.     Metoprolol 50 mg failed protocol due to  Hypertension Medications Protocol Rukmhx5401/07/2021 04:37 PM   CMP or BMP in past 12 months Protocol Details    Appointment in past 6 or next

## 2021-01-23 ENCOUNTER — OFFICE VISIT (OUTPATIENT)
Dept: INTERNAL MEDICINE CLINIC | Facility: CLINIC | Age: 56
End: 2021-01-23

## 2021-01-23 ENCOUNTER — LAB ENCOUNTER (OUTPATIENT)
Dept: LAB | Age: 56
End: 2021-01-23
Attending: FAMILY MEDICINE
Payer: COMMERCIAL

## 2021-01-23 VITALS
HEART RATE: 80 BPM | SYSTOLIC BLOOD PRESSURE: 146 MMHG | RESPIRATION RATE: 16 BRPM | OXYGEN SATURATION: 98 % | DIASTOLIC BLOOD PRESSURE: 100 MMHG | TEMPERATURE: 98 F | BODY MASS INDEX: 31.15 KG/M2 | HEIGHT: 71.13 IN | WEIGHT: 225 LBS

## 2021-01-23 DIAGNOSIS — Z00.00 LABORATORY EXAM ORDERED AS PART OF ROUTINE GENERAL MEDICAL EXAMINATION: ICD-10-CM

## 2021-01-23 DIAGNOSIS — I10 ESSENTIAL HYPERTENSION, BENIGN: ICD-10-CM

## 2021-01-23 DIAGNOSIS — Z00.00 ROUTINE GENERAL MEDICAL EXAMINATION AT A HEALTH CARE FACILITY: ICD-10-CM

## 2021-01-23 DIAGNOSIS — Z12.5 PROSTATE CANCER SCREENING: ICD-10-CM

## 2021-01-23 DIAGNOSIS — E78.00 PURE HYPERCHOLESTEROLEMIA: ICD-10-CM

## 2021-01-23 DIAGNOSIS — Z00.00 WELLNESS EXAMINATION: Primary | ICD-10-CM

## 2021-01-23 LAB
ALBUMIN SERPL-MCNC: 3.9 G/DL (ref 3.4–5)
ALBUMIN/GLOB SERPL: 1.1 {RATIO} (ref 1–2)
ALP LIVER SERPL-CCNC: 55 U/L
ALT SERPL-CCNC: 40 U/L
ANION GAP SERPL CALC-SCNC: 3 MMOL/L (ref 0–18)
AST SERPL-CCNC: 26 U/L (ref 15–37)
BASOPHILS # BLD AUTO: 0.03 X10(3) UL (ref 0–0.2)
BASOPHILS NFR BLD AUTO: 0.6 %
BILIRUB SERPL-MCNC: 0.4 MG/DL (ref 0.1–2)
BILIRUB UR QL STRIP.AUTO: NEGATIVE
BUN BLD-MCNC: 14 MG/DL (ref 7–18)
BUN/CREAT SERPL: 15.6 (ref 10–20)
CALCIUM BLD-MCNC: 9.3 MG/DL (ref 8.5–10.1)
CHLORIDE SERPL-SCNC: 107 MMOL/L (ref 98–112)
CHOLEST SMN-MCNC: 242 MG/DL (ref ?–200)
CLARITY UR REFRACT.AUTO: CLEAR
CO2 SERPL-SCNC: 29 MMOL/L (ref 21–32)
COLOR UR AUTO: YELLOW
COMPLEXED PSA SERPL-MCNC: 0.42 NG/ML (ref ?–4)
CREAT BLD-MCNC: 0.9 MG/DL
DEPRECATED RDW RBC AUTO: 43.5 FL (ref 35.1–46.3)
EOSINOPHIL # BLD AUTO: 0.2 X10(3) UL (ref 0–0.7)
EOSINOPHIL NFR BLD AUTO: 4 %
ERYTHROCYTE [DISTWIDTH] IN BLOOD BY AUTOMATED COUNT: 13.3 % (ref 11–15)
EST. AVERAGE GLUCOSE BLD GHB EST-MCNC: 123 MG/DL (ref 68–126)
GLOBULIN PLAS-MCNC: 3.7 G/DL (ref 2.8–4.4)
GLUCOSE BLD-MCNC: 114 MG/DL (ref 70–99)
GLUCOSE UR STRIP.AUTO-MCNC: NEGATIVE MG/DL
HBA1C MFR BLD HPLC: 5.9 % (ref ?–5.7)
HCT VFR BLD AUTO: 45.4 %
HDLC SERPL-MCNC: 46 MG/DL (ref 40–59)
HGB BLD-MCNC: 14.5 G/DL
IMM GRANULOCYTES # BLD AUTO: 0.05 X10(3) UL (ref 0–1)
IMM GRANULOCYTES NFR BLD: 1 %
KETONES UR STRIP.AUTO-MCNC: NEGATIVE MG/DL
LDLC SERPL CALC-MCNC: 153 MG/DL (ref ?–100)
LEUKOCYTE ESTERASE UR QL STRIP.AUTO: NEGATIVE
LYMPHOCYTES # BLD AUTO: 1.93 X10(3) UL (ref 1–4)
LYMPHOCYTES NFR BLD AUTO: 38.4 %
M PROTEIN MFR SERPL ELPH: 7.6 G/DL (ref 6.4–8.2)
MCH RBC QN AUTO: 28.4 PG (ref 26–34)
MCHC RBC AUTO-ENTMCNC: 31.9 G/DL (ref 31–37)
MCV RBC AUTO: 89 FL
MONOCYTES # BLD AUTO: 0.55 X10(3) UL (ref 0.1–1)
MONOCYTES NFR BLD AUTO: 10.9 %
NEUTROPHILS # BLD AUTO: 2.27 X10 (3) UL (ref 1.5–7.7)
NEUTROPHILS # BLD AUTO: 2.27 X10(3) UL (ref 1.5–7.7)
NEUTROPHILS NFR BLD AUTO: 45.1 %
NITRITE UR QL STRIP.AUTO: NEGATIVE
NONHDLC SERPL-MCNC: 196 MG/DL (ref ?–130)
OSMOLALITY SERPL CALC.SUM OF ELEC: 289 MOSM/KG (ref 275–295)
PATIENT FASTING Y/N/NP: YES
PATIENT FASTING Y/N/NP: YES
PH UR STRIP.AUTO: 5 [PH] (ref 4.5–8)
PLATELET # BLD AUTO: 201 10(3)UL (ref 150–450)
POTASSIUM SERPL-SCNC: 4 MMOL/L (ref 3.5–5.1)
PROT UR STRIP.AUTO-MCNC: NEGATIVE MG/DL
RBC # BLD AUTO: 5.1 X10(6)UL
RBC UR QL AUTO: NEGATIVE
SODIUM SERPL-SCNC: 139 MMOL/L (ref 136–145)
SP GR UR STRIP.AUTO: 1.02 (ref 1–1.03)
TRIGL SERPL-MCNC: 213 MG/DL (ref 30–149)
UROBILINOGEN UR STRIP.AUTO-MCNC: <2 MG/DL
VLDLC SERPL CALC-MCNC: 43 MG/DL (ref 0–30)
WBC # BLD AUTO: 5 X10(3) UL (ref 4–11)

## 2021-01-23 PROCEDURE — 3077F SYST BP >= 140 MM HG: CPT | Performed by: FAMILY MEDICINE

## 2021-01-23 PROCEDURE — 3008F BODY MASS INDEX DOCD: CPT | Performed by: FAMILY MEDICINE

## 2021-01-23 PROCEDURE — 3080F DIAST BP >= 90 MM HG: CPT | Performed by: FAMILY MEDICINE

## 2021-01-23 PROCEDURE — 81003 URINALYSIS AUTO W/O SCOPE: CPT

## 2021-01-23 PROCEDURE — 80053 COMPREHEN METABOLIC PANEL: CPT

## 2021-01-23 PROCEDURE — 85025 COMPLETE CBC W/AUTO DIFF WBC: CPT

## 2021-01-23 PROCEDURE — 36415 COLL VENOUS BLD VENIPUNCTURE: CPT

## 2021-01-23 PROCEDURE — 83036 HEMOGLOBIN GLYCOSYLATED A1C: CPT

## 2021-01-23 PROCEDURE — 80061 LIPID PANEL: CPT

## 2021-01-23 PROCEDURE — 99396 PREV VISIT EST AGE 40-64: CPT | Performed by: FAMILY MEDICINE

## 2021-01-23 RX ORDER — AMLODIPINE BESYLATE 5 MG/1
5 TABLET ORAL DAILY
Qty: 90 TABLET | Refills: 0 | Status: SHIPPED | OUTPATIENT
Start: 2021-01-23 | End: 2021-04-14

## 2021-01-23 RX ORDER — METOPROLOL TARTRATE 50 MG/1
50 TABLET, FILM COATED ORAL 2 TIMES DAILY
Qty: 180 TABLET | Refills: 0 | Status: SHIPPED | OUTPATIENT
Start: 2021-01-23 | End: 2021-04-12

## 2021-01-23 NOTE — PROGRESS NOTES
Mykel Diaz is a 54year old male who presents for a complete physical exam.    Last appt Oct 2019       HPI:       Wt Readings from Last 6 Encounters:  01/23/21 : 225 lb (102.1 kg)  10/26/19 : 220 lb (99.8 kg)  02/01/19 : 215 lb (97.5 kg)  01/15/19 : 215 Relation Age of Onset   • Heart Disorder Other    • Hypertension Mother       Social History:  Social History    Tobacco Use      Smoking status: Former Smoker        Packs/day: 0.00        Quit date: 2005        Years since quittin.0      Smokele to the plan. The patient is asked to return for CPX in 1yr.

## 2021-01-26 RX ORDER — PITAVASTATIN CALCIUM 1.04 MG/1
1 TABLET, FILM COATED ORAL NIGHTLY
Qty: 30 TABLET | Refills: 0 | Status: SHIPPED | OUTPATIENT
Start: 2021-01-26 | End: 2021-01-28

## 2021-01-27 ENCOUNTER — TELEPHONE (OUTPATIENT)
Dept: INTERNAL MEDICINE CLINIC | Facility: CLINIC | Age: 56
End: 2021-01-27

## 2021-01-27 NOTE — TELEPHONE ENCOUNTER
Received fax about Livalo not being covered by insurance. Please advise if you would like to try alternative.

## 2021-01-28 RX ORDER — PRAVASTATIN SODIUM 20 MG
20 TABLET ORAL NIGHTLY
Qty: 90 TABLET | Refills: 0 | Status: SHIPPED | OUTPATIENT
Start: 2021-01-28 | End: 2021-04-12

## 2021-02-01 RX ORDER — METOPROLOL TARTRATE 50 MG/1
TABLET, FILM COATED ORAL
Qty: 60 TABLET | Refills: 0 | OUTPATIENT
Start: 2021-02-01

## 2021-04-12 RX ORDER — METOPROLOL TARTRATE 50 MG/1
TABLET, FILM COATED ORAL
Qty: 180 TABLET | Refills: 0 | Status: SHIPPED | OUTPATIENT
Start: 2021-04-12 | End: 2021-04-29

## 2021-04-12 RX ORDER — PRAVASTATIN SODIUM 20 MG
TABLET ORAL
Qty: 90 TABLET | Refills: 0 | Status: SHIPPED | OUTPATIENT
Start: 2021-04-12 | End: 2021-04-29

## 2021-04-12 NOTE — TELEPHONE ENCOUNTER
Name from pharmacy: METOPROLOL TARTRATE 50 MG TAB          Will file in chart as: METOPROLOL TARTRATE 50 MG Oral Tab    Sig: TAKE 1 TABLET BY MOUTH TWICE A DAY    Disp:  180 tablet    Refills:  0 (Pharmacy requested: Not specified)    Start: 4/12/2021    C

## 2021-04-14 RX ORDER — AMLODIPINE BESYLATE 5 MG/1
TABLET ORAL
Qty: 90 TABLET | Refills: 0 | Status: SHIPPED | OUTPATIENT
Start: 2021-04-14 | End: 2021-04-29

## 2021-04-14 NOTE — TELEPHONE ENCOUNTER
Protocol passed   Medication(s) to Refill:   Requested Prescriptions     Pending Prescriptions Disp Refills   • AMLODIPINE BESYLATE 5 MG Oral Tab [Pharmacy Med Name: AMLODIPINE BESYLATE 5 MG TAB] 90 tablet 0     Sig: TAKE 1 TABLET BY MOUTH EVERY DAY

## 2021-04-26 ENCOUNTER — TELEMEDICINE (OUTPATIENT)
Dept: INTERNAL MEDICINE CLINIC | Facility: CLINIC | Age: 56
End: 2021-04-26

## 2021-04-26 DIAGNOSIS — U07.1 COVID-19 VIRUS INFECTION: Primary | ICD-10-CM

## 2021-04-26 PROCEDURE — 99213 OFFICE O/P EST LOW 20 MIN: CPT | Performed by: FAMILY MEDICINE

## 2021-04-26 NOTE — PROGRESS NOTES
Bob Lake is a 64year old male. HPI:   Patient has agreed to do a doximity (his phone did not allow the video portion) encounter w me today in lieu of a regular appointment in regards to his URI s/s    Was in [place]  6 d ago w body aches.  HAs  Taking otc a worse or persists          The patient indicates understanding of these issues and agrees to the plan. Ross Banuelos

## 2021-04-29 ENCOUNTER — APPOINTMENT (OUTPATIENT)
Dept: GENERAL RADIOLOGY | Facility: HOSPITAL | Age: 56
DRG: 177 | End: 2021-04-29
Attending: EMERGENCY MEDICINE
Payer: COMMERCIAL

## 2021-04-29 ENCOUNTER — HOSPITAL ENCOUNTER (INPATIENT)
Facility: HOSPITAL | Age: 56
LOS: 6 days | Discharge: HOME | DRG: 177 | End: 2021-05-05
Attending: EMERGENCY MEDICINE | Admitting: HOSPITALIST
Payer: COMMERCIAL

## 2021-04-29 ENCOUNTER — TELEPHONE (OUTPATIENT)
Dept: INTERNAL MEDICINE CLINIC | Facility: CLINIC | Age: 56
End: 2021-04-29

## 2021-04-29 DIAGNOSIS — R09.02 HYPOXIA: Primary | ICD-10-CM

## 2021-04-29 DIAGNOSIS — U07.1 COVID-19 VIRUS INFECTION: ICD-10-CM

## 2021-04-29 DIAGNOSIS — D70.9 NEUTROPENIA, UNSPECIFIED TYPE (HCC): ICD-10-CM

## 2021-04-29 PROBLEM — E87.6 HYPOKALEMIA: Status: ACTIVE | Noted: 2021-04-29

## 2021-04-29 PROCEDURE — 3E0333Z INTRODUCTION OF ANTI-INFLAMMATORY INTO PERIPHERAL VEIN, PERCUTANEOUS APPROACH: ICD-10-PCS | Performed by: HOSPITALIST

## 2021-04-29 PROCEDURE — 99223 1ST HOSP IP/OBS HIGH 75: CPT | Performed by: HOSPITALIST

## 2021-04-29 PROCEDURE — 71045 X-RAY EXAM CHEST 1 VIEW: CPT | Performed by: EMERGENCY MEDICINE

## 2021-04-29 RX ORDER — AMLODIPINE BESYLATE 5 MG/1
5 TABLET ORAL DAILY
Status: DISCONTINUED | OUTPATIENT
Start: 2021-04-29 | End: 2021-05-05

## 2021-04-29 RX ORDER — ONDANSETRON 2 MG/ML
4 INJECTION INTRAMUSCULAR; INTRAVENOUS EVERY 6 HOURS PRN
Status: DISCONTINUED | OUTPATIENT
Start: 2021-04-29 | End: 2021-05-05

## 2021-04-29 RX ORDER — MAG HYDROX/ALUMINUM HYD/SIMETH 400-400-40
5000 SUSPENSION, ORAL (FINAL DOSE FORM) ORAL DAILY
COMMUNITY

## 2021-04-29 RX ORDER — ENOXAPARIN SODIUM 100 MG/ML
40 INJECTION SUBCUTANEOUS DAILY
Status: DISCONTINUED | OUTPATIENT
Start: 2021-04-29 | End: 2021-05-05

## 2021-04-29 RX ORDER — ALBUTEROL SULFATE 90 UG/1
4 AEROSOL, METERED RESPIRATORY (INHALATION) EVERY 4 HOURS PRN
Status: DISCONTINUED | OUTPATIENT
Start: 2021-04-29 | End: 2021-05-05

## 2021-04-29 RX ORDER — CALCIUM CARBONATE/VITAMIN D3 500-10/5ML
30 LIQUID (ML) ORAL DAILY
COMMUNITY

## 2021-04-29 RX ORDER — ASCORBIC ACID 500 MG
1000 TABLET ORAL DAILY
Status: DISCONTINUED | OUTPATIENT
Start: 2021-04-29 | End: 2021-04-30

## 2021-04-29 RX ORDER — RIBOFLAVIN (VITAMIN B2) 100 MG
100 TABLET ORAL DAILY
COMMUNITY

## 2021-04-29 RX ORDER — METOPROLOL TARTRATE 50 MG/1
50 TABLET, FILM COATED ORAL 2 TIMES DAILY
COMMUNITY
End: 2021-07-06

## 2021-04-29 RX ORDER — ACETAMINOPHEN 325 MG/1
650 TABLET ORAL EVERY 6 HOURS PRN
Status: DISCONTINUED | OUTPATIENT
Start: 2021-04-29 | End: 2021-05-05

## 2021-04-29 RX ORDER — GUAIFENESIN 600 MG
600 TABLET, EXTENDED RELEASE 12 HR ORAL 2 TIMES DAILY
Status: DISCONTINUED | OUTPATIENT
Start: 2021-04-29 | End: 2021-05-05

## 2021-04-29 RX ORDER — ZINC SULFATE 50(220)MG
220 CAPSULE ORAL 2 TIMES DAILY
Status: DISCONTINUED | OUTPATIENT
Start: 2021-04-29 | End: 2021-04-30

## 2021-04-29 RX ORDER — ACETAMINOPHEN 160 MG
2000 TABLET,DISINTEGRATING ORAL DAILY
Status: DISCONTINUED | OUTPATIENT
Start: 2021-04-29 | End: 2021-04-30

## 2021-04-29 RX ORDER — DEXAMETHASONE SODIUM PHOSPHATE 4 MG/ML
6 VIAL (ML) INJECTION DAILY
Status: DISCONTINUED | OUTPATIENT
Start: 2021-04-29 | End: 2021-05-05

## 2021-04-29 RX ORDER — METOPROLOL TARTRATE 50 MG/1
50 TABLET, FILM COATED ORAL
Status: DISCONTINUED | OUTPATIENT
Start: 2021-04-30 | End: 2021-05-05

## 2021-04-29 RX ORDER — AMLODIPINE BESYLATE 5 MG/1
5 TABLET ORAL DAILY
COMMUNITY
End: 2021-07-07

## 2021-04-29 RX ORDER — CYANOCOBALAMIN (VITAMIN B-12) 5000 MCG
5000 TABLET,DISINTEGRATING ORAL DAILY
COMMUNITY

## 2021-04-29 NOTE — TELEPHONE ENCOUNTER
pts wife called stating that both her and her  are covid positive and not feeling well. They recently had a video visit with TO and he advised them to give us a call if they are not feeling well. Pt and wife are experiencing chills and a slight cough. Pts wife stated that pts oxygen is under 90, its been around 87-89. Pt would like to be prescribed with an antibiotic to help or some recommendations.  Please advise

## 2021-04-29 NOTE — ED INITIAL ASSESSMENT (HPI)
Patient to ED c/o covid symptoms x 10 days, tested positive 7 days ago.  + abdominal pain, fatigue and difficulty breathing at times.

## 2021-04-29 NOTE — TELEPHONE ENCOUNTER
Spoke with patient stating tested positive for COVID at an outside source and not feeling well, patient complaints of abdominal pain, patient indicates his oxygen levels range 87-90%, with pulse ox, patient denies SOB, CP, or feeling lethargic, advised patient should proceed to ER for further evaluation. Patient verbalized understanding and agreeable to POC.    FYI to TO

## 2021-04-30 PROCEDURE — 99233 SBSQ HOSP IP/OBS HIGH 50: CPT | Performed by: STUDENT IN AN ORGANIZED HEALTH CARE EDUCATION/TRAINING PROGRAM

## 2021-04-30 PROCEDURE — XW033E5 INTRODUCTION OF REMDESIVIR ANTI-INFECTIVE INTO PERIPHERAL VEIN, PERCUTANEOUS APPROACH, NEW TECHNOLOGY GROUP 5: ICD-10-PCS | Performed by: INTERNAL MEDICINE

## 2021-04-30 NOTE — PLAN OF CARE
COVID-19 Daily Discharge Readiness-Nursing    O2 Sat at Rest: 95% on 10L  O2 Sat with Exertion:    % on    liters   Temperature max from last 24 hrs: Temp (24hrs), Av.1 °F (37.3 °C), Min:98.1 °F (36.7 °C), Max:99.7 °F (37.6 °C)    Inflammatory Markers: Care Physician     [] Other Specialty    Watched the home discharge recovery videos related to diagnosis. .. [x] Pneumonia     [] COPD    [] Home Health set up. [x] Care partner identified and updated with the plan of care.       Problem: Patient/Fami 2347 by Laurie Fernandez, RN  Outcome: Progressing     Problem: RESPIRATORY - ADULT  Goal: Achieves optimal ventilation and oxygenation  Description: INTERVENTIONS:  - Assess for changes in respiratory status  - Assess for changes in mentation and b

## 2021-04-30 NOTE — H&P
[facility]  History and Physical     Vennie Done Patient Status:  Emergency    3/30/1965 MRN [id number]   Location [facility] [facility] Attending Shalom Canas MD   Hosp Day # 0 PCP Marybelle Mortimer, MD     Chief Complaint: Rachele Disp: , Rfl:   Zinc 30 MG Oral Cap, Take 30 mg by mouth daily. , Disp: , Rfl:   Vitamin D3, Cholecalciferol, (VITAMIN D3) 125 MCG (5000 UT) Oral Cap, Take 5,000 Units by mouth daily. , Disp: , Rfl:   omega-3 fatty acids 1000 MG Oral Cap, Take 1,000 mg by mahogany Detected (A) 04/22/2021       Pro-Calcitonin  Recent Labs   Lab 04/29/21  1839   PCT <0.05       Cardiac  Recent Labs   Lab 04/29/21  1839   TROP <0.045   PBNP 11       Creatinine Kinase  Recent Labs   Lab 04/29/21 1839   CK 1,670*       Inflammatory Rahul Bustamante

## 2021-04-30 NOTE — COVID NURSING ASSESSMENT
COVID-19 Daily Discharge Readiness-Nursing    O2 Sat at Rest: 92% on 12LHFNC  Temperature max from last 24 hrs: Temp (24hrs), Av.6 °F (37.6 °C), Min:99.4 °F (37.4 °C), Max:99.7 °F (37.6 °C)    Inflammatory Markers:   Recent Labs   Lab 21  0194

## 2021-04-30 NOTE — PROGRESS NOTES
NURSING ADMISSION NOTE      Patient admitted via Cart  Oriented to room. Safety precautions initiated. Bed in low position. Call light in reach.     Pt admitted to floor and admission navigator completed

## 2021-04-30 NOTE — CONSULTS
INFECTIOUS DISEASE CONSULTATION    Adolph Guan Patient Status:  Inpatient    3/30/1965 MRN [id number]   [facility] 5NW-A Attending Lima Gurrola MD   Hosp Day # 1 PCP Hi Serrano MD 100 mg, Intravenous, Q24H  •  amLODIPine Besylate (NORVASC) tab 5 mg, 5 mg, Oral, Daily  •  Metoprolol Tartrate (LOPRESSOR) tab 50 mg, 50 mg, Oral, 2x Daily(Beta Blocker)  •  guaiFENesin ER (MUCINEX) 12 hr tab 600 mg, 600 mg, Oral, BID  •  Albuterol Sulfat °F (37.6 °C)] 98.1 °F (36.7 °C)  Pulse:  [67-96] 67  Resp:  [18-38] 20  BP: (100-126)/(60-84) 109/71  HEENT: Moist mucous membranes. Extraocular muscles are intact.   Neck: No swelling, no masses  Respiratory: Non labored, symmetric exursion  Cardiovascular infection     Neutropenia, unspecified type ([facility]      ASSESSMENT/PLAN:  1.  COVID-19 pneumonia  Un-immunized  Works as , lives with wife and son, wife symptoms started 1 day prior.  -onset of symptoms 4/20, tested positive on 4/22  Admitted 4/2

## 2021-04-30 NOTE — PROGRESS NOTES
[facility]  Progress Note     Jeff Quinones Patient Status:  Inpatient    3/30/1965 MRN [id number]   [facility] 5NW-A Attending Gurpreet Saba MD   Hosp Day # 1 PCP Jordin Crump MD     Chief Complaint: COVID     S: Patient 87% on 8 L Cardiac  Recent Labs   Lab 04/29/21 1839 04/30/21  0046   TROP <0.045 <0.045   PBNP 11  --        Creatinine Kinase  Recent Labs   Lab 04/29/21 1839   CK 1,670*       Inflammatory Markers  Recent Labs   Lab 04/29/21 1839 04/30/21  0621   CRP 2.41*

## 2021-04-30 NOTE — ED PROVIDER NOTES
Patient Seen in: [facility] Emergency Department      History   Patient presents with:  Difficulty Breathing  Dizziness    Stated Complaint: +covid, sob, dizzy, abdominal pain, weakness 91%RA in triage    HPI/Subjective:   HPI    40-year-old male pr SpO2 91 %   O2 Device None (Room air)       Current:/79   Pulse 81   Temp 99.4 °F (37.4 °C) (Temporal)   Resp (!) 38   Ht 177.8 cm (5' 10\")   Wt 102.1 kg   SpO2 91%   BMI 32.28 kg/m²         Physical Exam    All measures to prevent infection trans components:     (*)     All other components within normal limits   C-REACTIVE PROTEIN - Abnormal; Notable for the following components:    C-Reactive Protein 2.41 (*)     All other components within normal limits   CK CREATINE KINASE (NOT CREATININ opacities scattered in the bilateral lungs are concerning for atypical pneumonia from COVID-19 infection, with other etiologies not entirely excluded. Clinical correlation recommended. Borderline heart size with normal pulmonary vascularity.  No pleural e PM                                  Disposition and Plan     Clinical Impression:  Hypoxia  (primary encounter diagnosis)  COVID-19 virus infection  Neutropenia, unspecified type ([facility]     Disposition:  Admit  4/29/2021  7:30 pm    Follow-up:  No follow-up

## 2021-05-01 PROCEDURE — 99232 SBSQ HOSP IP/OBS MODERATE 35: CPT | Performed by: STUDENT IN AN ORGANIZED HEALTH CARE EDUCATION/TRAINING PROGRAM

## 2021-05-01 NOTE — PROGRESS NOTES
[facility]                INFECTIOUS DISEASE PROGRESS NOTE    Elgin Mills Patient Status:  Inpatient    3/30/1965 MRN [id number]   [facility] 5NW-A Attending Teo Romo MD   Hosp Day # 2 PCP Jasmin Laguerre MD     Antibiotics:Re 46 45 43*   AST 72* 67* 48*   ALT 55 53 49   BILT 0.5 0.5 0.4   TP 7.7 7.7 7.6       No results found for: [facility] Encounter on 04/29/21   1.  BLOOD CULTURE     Status: None (Preliminary result)    Collection Time: 04/29/21  6:39 PM

## 2021-05-01 NOTE — COVID NURSING ASSESSMENT
COVID-19 Daily Discharge Readiness-Nursing    O2 Sat at Rest:   93  %  On 6L HFNC  O2 Sat with Exertion:    % on    liters   Temperature max from last 24 hrs: Temp (24hrs), Av.9 °F (36.6 °C), Min:97.8 °F (36.6 °C), Max:98.3 °F (36.8 °C)    Inflammatory

## 2021-05-01 NOTE — PLAN OF CARE
Assumed care 2000  Patient alert and oriented x4  6  nasal cannula at rest  Denies pain  Encouraged proning

## 2021-05-01 NOTE — PROGRESS NOTES
[facility]  Progress Note     Tim Pacheco Patient Status:  Inpatient    3/30/1965 MRN [id number]   [facility] 5NW-A Attending Shari Dubon MD   Hosp Day # 2 PCP Deni Cameron MD     Chief Complaint: COVID     S: Patient clinically Value Date    COVID19 Detected (A) 04/22/2021       Pro-Calcitonin  Recent Labs   Lab 04/29/21  1839   PCT <0.05       Cardiac  Recent Labs   Lab 04/29/21  1839 04/30/21  0046   TROP <0.045 <0.045   PBNP 11  --        Creatinine Kinase  Recent Labs   Lab 0

## 2021-05-02 PROCEDURE — 99232 SBSQ HOSP IP/OBS MODERATE 35: CPT | Performed by: STUDENT IN AN ORGANIZED HEALTH CARE EDUCATION/TRAINING PROGRAM

## 2021-05-02 NOTE — PROGRESS NOTES
[facility]  Progress Note     Tadeo Michellemiguelito Patient Status:  Inpatient    3/30/1965 MRN [id number]   [facility] 5NW-A Attending Verna Sorenson MD   Hosp Day # 3 PCP Aryan Taylor MD     Chief Complaint: COVID     S: Patient using IS, Component Value Date    COVID19 Detected (A) 04/22/2021       Pro-Calcitonin  Recent Labs   Lab 04/29/21  1839   PCT <0.05       Cardiac  Recent Labs   Lab 04/29/21 1839 04/30/21  0046   TROP <0.045 <0.045   PBNP 11  --        Creatinine Kinase  Recent

## 2021-05-02 NOTE — COVID NURSING ASSESSMENT
COVID-19 Daily Discharge Readiness-Nursing    O2 Sat at Rest:    6L sat 94 %   O2 Sat with Exertion:    % on    liters   Temperature max from last 24 hrs: Temp (24hrs), Av.8 °F (36.6 °C), Min:97.8 °F (36.6 °C), Max:98 °F (36.7 °C)    Inflammatory Marke

## 2021-05-03 PROCEDURE — 99232 SBSQ HOSP IP/OBS MODERATE 35: CPT | Performed by: STUDENT IN AN ORGANIZED HEALTH CARE EDUCATION/TRAINING PROGRAM

## 2021-05-03 RX ORDER — PANTOPRAZOLE SODIUM 40 MG/1
40 TABLET, DELAYED RELEASE ORAL
Status: DISCONTINUED | OUTPATIENT
Start: 2021-05-03 | End: 2021-05-05

## 2021-05-03 RX ORDER — ECHINACEA PURPUREA EXTRACT 125 MG
1 TABLET ORAL
Status: DISCONTINUED | OUTPATIENT
Start: 2021-05-03 | End: 2021-05-05

## 2021-05-03 NOTE — COVID NURSING ASSESSMENT
COVID-19 Daily Discharge Readiness-Nursing    O2 Sat at Rest:    92%   O2 Sat with Exertion:   90 % on  6 liters   Temperature max from last 24 hrs: Temp (24hrs), Av.8 °F (36.6 °C), Min:97.6 °F (36.4 °C), Max:98 °F (36.7 °C)    Inflammatory Markers:

## 2021-05-03 NOTE — PLAN OF CARE
ED admit 4/29 COVID+ increasing SOB, Pt is AAOX4, VSS, needing 6L O2 NC, able to prone as needed, IS encouraged, up ad kamryn, voiding freely to restroom, TELE, IV SL, plan to wean O2, Pt doing well, all needs met, all safety measures in place, call light wit

## 2021-05-03 NOTE — PROGRESS NOTES
[facility]                INFECTIOUS DISEASE PROGRESS NOTE    Vennie Done Patient Status:  Inpatient    3/30/1965 MRN [id number]   [facility] 5NW-A Attending Vic Rocha MD   Hosp Day # 4 PCP Marybelle Mortimer, MD     Antibiotics:Re 43* 40* 38*   AST 48* 32 26   ALT 49 39 36   BILT 0.4 0.4 0.4   TP 7.6 6.9 6.7       No results found for: [facility] Encounter on 04/29/21   1.  BLOOD CULTURE     Status: None (Preliminary result)    Collection Time: 04/29/21  6:39 PM

## 2021-05-03 NOTE — COVID NURSING ASSESSMENT
COVID-19 Daily Discharge Readiness-Nursing    O2 Sat at Rest:    95 %  On 4LNC  O2 Sat with Exertion:  90  % on  6  liters   Temperature max from last 24 hrs: Temp (24hrs), Av °F (36.7 °C), Min:97.6 °F (36.4 °C), Max:98.2 °F (36.8 °C)    Inflammatory M

## 2021-05-03 NOTE — PROGRESS NOTES
[facility]  Progress Note     Tim Pacheco Patient Status:  Inpatient    3/30/1965 MRN [id number]   [facility] 5NW-A Attending Shari Dubon MD   Hosp Day # 4 PCP Deni Cameron MD     Chief Complaint: COVID     S: Patient  Feeling b COVID-19 Lab Results    COVID-19  Lab Results   Component Value Date    COVID19 Detected (A) 04/22/2021       Pro-Calcitonin  Recent Labs   Lab 04/29/21 1839   PCT <0.05       Cardiac  Recent Labs   Lab 04/29/21 1839 04/30/21  0046   TROP <0.045 <0.0

## 2021-05-04 PROCEDURE — 99232 SBSQ HOSP IP/OBS MODERATE 35: CPT | Performed by: STUDENT IN AN ORGANIZED HEALTH CARE EDUCATION/TRAINING PROGRAM

## 2021-05-04 NOTE — PROGRESS NOTES
[facility]                INFECTIOUS DISEASE PROGRESS NOTE    Mykel Diaz Patient Status:  Inpatient    3/30/1965 MRN [id number]   [facility] 5NW-A Attending Raven York MD   Hosp Day # 5 PCP Irineo Aguilar MD     Antibiotics:Re 38* 46   AST 32 26 30   ALT 39 36 45   BILT 0.4 0.4 0.5   TP 6.9 6.7 6.9       No results found for: [facility] Encounter on 04/29/21   1.  BLOOD CULTURE     Status: None (Preliminary result)    Collection Time: 04/29/21  6:39 PM    Spe

## 2021-05-04 NOTE — PROGRESS NOTES
[facility]  Progress Note     Mauricio Him Patient Status:  Inpatient    3/30/1965 MRN [id number]   [facility] 5NW-A Attending Jenny Phillips MD   Hosp Day # 5 PCP Troy Crowder MD     Chief Complaint: COVID     S: Patient afebrile, Results   Component Value Date    COVID19 Detected (A) 04/22/2021       Pro-Calcitonin  Recent Labs   Lab 04/29/21  1839   PCT <0.05       Cardiac  Recent Labs   Lab 04/29/21 1839 04/30/21  0046   TROP <0.045 <0.045   PBNP 11  --        Creatinine Kinase never

## 2021-05-04 NOTE — PROGRESS NOTES
COVID-19 Daily Discharge Readiness-Nursing    O2 Sat at Rest:    90 % 3L  O2 Sat with Exertion:    % on    liters   Temperature max from last 24 hrs: Temp (24hrs), Av.1 °F (36.7 °C), Min:97.4 °F (36.3 °C), Max:98.8 °F (37.1 °C)    Inflammatory Markers:

## 2021-05-04 NOTE — COVID NURSING ASSESSMENT
COVID-19 Daily Discharge Readiness-Nursing    O2 Sat at Rest:   92 % 2L nasal cannula  O2 Sat with Exertion:    % on    liters   Temperature max from last 24 hrs: Temp (24hrs), Av.3 °F (36.8 °C), Min:97.4 °F (36.3 °C), Max:99 °F (37.2 °C)    Inflammato

## 2021-05-05 VITALS
BODY MASS INDEX: 31.16 KG/M2 | SYSTOLIC BLOOD PRESSURE: 106 MMHG | WEIGHT: 217.63 LBS | OXYGEN SATURATION: 94 % | TEMPERATURE: 98 F | HEART RATE: 72 BPM | DIASTOLIC BLOOD PRESSURE: 62 MMHG | HEIGHT: 70 IN | RESPIRATION RATE: 18 BRPM

## 2021-05-05 PROCEDURE — 99239 HOSP IP/OBS DSCHRG MGMT >30: CPT | Performed by: INTERNAL MEDICINE

## 2021-05-05 NOTE — COVID NURSING ASSESSMENT
COVID-19 Daily Discharge Readiness-Nursing    O2 Sat at Rest:  SPO2% on Room Air at Rest: 93  %   O2 Sat with Exertion: SPO2% Ambulation on Oxygen: 95  % on Ambulation oxygen flow (liters per minute): 1  liters   Temperature max from last 24 hrs: Temp (24h

## 2021-05-05 NOTE — PLAN OF CARE
Problem: Patient/Family Goals  Goal: Patient/Family Long Term Goal  Description: Patient's Long Term Goal: To discharge home with adequate resources    Interventions:  - follow plan of care  - collaborate with healthcare team  - See additional Care Plan

## 2021-05-05 NOTE — PROGRESS NOTES
[facility]                INFECTIOUS DISEASE PROGRESS NOTE    Anjali Maldonado Patient Status:  Inpatient    3/30/1965 MRN [id number]   [facility] 5NW-A Attending Ced Nina MD   Hosp Day # 6 PCP King Campbell MD     Antibiotics:Re 25.0   ALKPHO 40* 38* 46   AST 32 26 30   ALT 39 36 45   BILT 0.4 0.4 0.5   TP 6.9 6.7 6.9       No results found for: [address] Encounter on 04/29/21   1.  BLOOD CULTURE     Status: None    Collection Time: 04/29/21  6:39 PM    Specim

## 2021-05-05 NOTE — PROGRESS NOTES
[facility]  Progress Note     Claudeen Pinks Patient Status:  Inpatient    3/30/1965 MRN [id number]   [facility] 5NW-A Attending Chula Yan MD   Hosp Day # 6 PCP Delroy Oliveira MD     Chief Complaint: COVID     S: Patient afebrile, COVID-19 Lab Results    COVID-19  Lab Results   Component Value Date    COVID19 Detected (A) 04/22/2021       Pro-Calcitonin  Recent Labs   Lab 04/29/21 1839   PCT <0.05       Cardiac  Recent Labs   Lab 04/29/21 1839 04/30/21  0046   TROP <0.045 <0.0

## 2021-05-05 NOTE — DISCHARGE SUMMARY
[facility]  DISCHARGE SUMMARY     [facility] Patient Status:  Inpatient    3/30/1965 MRN [id number]   [facility] 5NW-A Attending Jesús Abernathy MD   [facility] Day # 6 PCP Chiqui Will MD     Date of Admission: 2021  Date of Dis 5000 MCG Tbdp      Take 5,000 mcg by mouth daily. Refills: 0     vitamin C 100 MG Tabs      Take 100 mg by mouth daily. Refills: 0     Vitamin D3 (Cholecalciferol) 125 MCG (5000 UT) Caps      Take 5,000 Units by mouth daily.    Refills: 0     Zinc 30 MG

## 2021-05-05 NOTE — PROGRESS NOTES
NURSING DISCHARGE NOTE    Discharged Home via Wheelchair. Accompanied by Support staff  Belongings Taken by patient/family. Patient discharged home with pulse oximeter. To follow up with PCP in the next week.  Educated on isolation and need for foll

## 2021-05-06 ENCOUNTER — TELEPHONE (OUTPATIENT)
Dept: INTERNAL MEDICINE CLINIC | Facility: CLINIC | Age: 56
End: 2021-05-06

## 2021-05-06 ENCOUNTER — PATIENT OUTREACH (OUTPATIENT)
Dept: CASE MANAGEMENT | Age: 56
End: 2021-05-06

## 2021-05-06 ENCOUNTER — TELEMEDICINE (OUTPATIENT)
Dept: INTERNAL MEDICINE CLINIC | Facility: CLINIC | Age: 56
End: 2021-05-06

## 2021-05-06 DIAGNOSIS — Z02.9 ENCOUNTERS FOR ADMINISTRATIVE PURPOSE: ICD-10-CM

## 2021-05-06 DIAGNOSIS — U07.1 COVID-19 VIRUS INFECTION: Primary | ICD-10-CM

## 2021-05-06 PROCEDURE — 99213 OFFICE O/P EST LOW 20 MIN: CPT | Performed by: FAMILY MEDICINE

## 2021-05-06 NOTE — PROGRESS NOTES
Attempted to reach the patient to complete [facility] FU call. Left a message for the pt to call [facility] back at, [phone].   A TE was sent to [facility] for an appointment request.

## 2021-05-06 NOTE — TELEPHONE ENCOUNTER
Attempted to reach the pt for TCM today. The patient was recently hospitalized for COVID/PNA. The pt does not have a virutal HFU appt scheduled at this time. A virtual TCM/HFU appt recommended by 5/7/2021, as the pt is a high risk for readmission.   Please

## 2021-05-06 NOTE — TELEPHONE ENCOUNTER
Patient does not have mychart but scheduled for doximity visit. Patient sounded confused on how to set it up.     Future Appointments   Date Time Provider Mikaela Hilliard   5/6/2021  2:45 PM Yahir Hernandez MD [facility] [place]

## 2021-05-06 NOTE — PROGRESS NOTES
Sharda De Santiago is a 64year old male.   HPI:   Patient has agreed to do a phone encounter w me today in lieu of a regular appointment in regards to his COVID infection  Video portion not able to be connected    Was in [facility] for 5 days d/t COVID  Oxygen was in the speech is nl      ASSESSMENT AND PLAN:   (U07.1) COVID-19 virus infection  (primary encounter diagnosis)  Plan: await [facility] records   Seems to be getting better   Lets CPM w rest , eat well, hydrate etc   Call if any changes          The patient indicates und

## 2021-07-03 DIAGNOSIS — I10 ESSENTIAL HYPERTENSION, BENIGN: ICD-10-CM

## 2021-07-03 DIAGNOSIS — E78.00 PURE HYPERCHOLESTEROLEMIA: Primary | ICD-10-CM

## 2021-07-06 RX ORDER — METOPROLOL TARTRATE 50 MG/1
TABLET, FILM COATED ORAL
Qty: 180 TABLET | Refills: 0 | Status: SHIPPED | OUTPATIENT
Start: 2021-07-06 | End: 2021-10-22

## 2021-07-06 RX ORDER — PRAVASTATIN SODIUM 20 MG
TABLET ORAL
Qty: 90 TABLET | Refills: 0 | Status: SHIPPED | OUTPATIENT
Start: 2021-07-06 | End: 2021-08-31

## 2021-07-06 NOTE — TELEPHONE ENCOUNTER
Metoprolol   Filled 4-12-21  Qty 180  0 refills  No upcoming appt. LOV 1-23-21    Pravastatin 20 mg  Filled 4-12-21  Qty 90  0 refills  No upcoming appt.   LOV 1-23-21    PER TE 1-27-12 Pravastatin alternative

## 2021-07-07 RX ORDER — AMLODIPINE BESYLATE 5 MG/1
TABLET ORAL
Qty: 30 TABLET | Refills: 0 | Status: SHIPPED | OUTPATIENT
Start: 2021-07-07 | End: 2021-08-24

## 2021-07-07 NOTE — TELEPHONE ENCOUNTER
Passed protocol - [employer] message sent to pt to contact office to schedule BP f/u      Requesting AMLODIPINE BESYLATE 5 MG Oral Tab   LOV: 1/23/21  RTC: 3 months for BP check  Last Relevant Labs: 5/4/21  Filled: 4/14/21 #90 with 0 refills    No future appo

## 2021-08-12 RX ORDER — AMLODIPINE BESYLATE 5 MG/1
TABLET ORAL
Qty: 30 TABLET | Refills: 0 | OUTPATIENT
Start: 2021-08-12

## 2021-08-12 NOTE — TELEPHONE ENCOUNTER
Spoke to pt to get clarification if they (him and his wife) are switching providers. Explained to him that in our system it already shows he switched, he just kept saying he does not know who he is seeing. PCP on file: Dr. Pham Goods    Not sure if you want to refill the amlodipine for the meant time.     Hypertension Medications Protocol Otxauf5708/12/2021 10:32 AM   Appointment in past 6 or next 3 months

## 2021-08-24 ENCOUNTER — LAB ENCOUNTER (OUTPATIENT)
Dept: LAB | Age: 56
End: 2021-08-24
Attending: NURSE PRACTITIONER
Payer: COMMERCIAL

## 2021-08-24 ENCOUNTER — OFFICE VISIT (OUTPATIENT)
Dept: INTERNAL MEDICINE CLINIC | Facility: CLINIC | Age: 56
End: 2021-08-24

## 2021-08-24 VITALS
WEIGHT: 222 LBS | BODY MASS INDEX: 31.78 KG/M2 | DIASTOLIC BLOOD PRESSURE: 86 MMHG | TEMPERATURE: 98 F | HEART RATE: 88 BPM | SYSTOLIC BLOOD PRESSURE: 138 MMHG | HEIGHT: 70 IN | RESPIRATION RATE: 16 BRPM | OXYGEN SATURATION: 98 %

## 2021-08-24 DIAGNOSIS — Z00.00 LABORATORY EXAMINATION ORDERED AS PART OF A ROUTINE GENERAL MEDICAL EXAMINATION: ICD-10-CM

## 2021-08-24 DIAGNOSIS — I10 ESSENTIAL HYPERTENSION, BENIGN: ICD-10-CM

## 2021-08-24 DIAGNOSIS — E78.00 PURE HYPERCHOLESTEROLEMIA: ICD-10-CM

## 2021-08-24 DIAGNOSIS — Z12.11 COLON CANCER SCREENING: ICD-10-CM

## 2021-08-24 DIAGNOSIS — Z00.00 ANNUAL PHYSICAL EXAM: Primary | ICD-10-CM

## 2021-08-24 PROBLEM — R09.02 HYPOXIA: Status: RESOLVED | Noted: 2021-04-29 | Resolved: 2021-08-24

## 2021-08-24 PROBLEM — D70.9 NEUTROPENIA, UNSPECIFIED TYPE (HCC): Status: RESOLVED | Noted: 2021-04-29 | Resolved: 2021-08-24

## 2021-08-24 PROBLEM — E87.6 HYPOKALEMIA: Status: RESOLVED | Noted: 2021-04-29 | Resolved: 2021-08-24

## 2021-08-24 PROBLEM — U07.1 COVID-19 VIRUS INFECTION: Status: RESOLVED | Noted: 2021-04-29 | Resolved: 2021-08-24

## 2021-08-24 PROBLEM — D70.9 NEUTROPENIA, UNSPECIFIED TYPE: Status: RESOLVED | Noted: 2021-04-29 | Resolved: 2021-08-24

## 2021-08-24 LAB
ALBUMIN SERPL-MCNC: 4.1 G/DL (ref 3.4–5)
ALBUMIN/GLOB SERPL: 1.1 {RATIO} (ref 1–2)
ALP LIVER SERPL-CCNC: 58 U/L
ALT SERPL-CCNC: 36 U/L
ANION GAP SERPL CALC-SCNC: 5 MMOL/L (ref 0–18)
AST SERPL-CCNC: 24 U/L (ref 15–37)
BASOPHILS # BLD AUTO: 0.01 X10(3) UL (ref 0–0.2)
BASOPHILS NFR BLD AUTO: 0.2 %
BILIRUB SERPL-MCNC: 0.6 MG/DL (ref 0.1–2)
BILIRUB UR QL STRIP.AUTO: NEGATIVE
BUN BLD-MCNC: 12 MG/DL (ref 7–18)
CALCIUM BLD-MCNC: 9.2 MG/DL (ref 8.5–10.1)
CHLORIDE SERPL-SCNC: 105 MMOL/L (ref 98–112)
CHOLEST SMN-MCNC: 227 MG/DL (ref ?–200)
CO2 SERPL-SCNC: 28 MMOL/L (ref 21–32)
COLOR UR AUTO: YELLOW
COMPLEXED PSA SERPL-MCNC: 0.54 NG/ML (ref ?–4)
CREAT BLD-MCNC: 0.95 MG/DL
EOSINOPHIL # BLD AUTO: 0.22 X10(3) UL (ref 0–0.7)
EOSINOPHIL NFR BLD AUTO: 3.6 %
ERYTHROCYTE [DISTWIDTH] IN BLOOD BY AUTOMATED COUNT: 13.8 %
EST. AVERAGE GLUCOSE BLD GHB EST-MCNC: 137 MG/DL (ref 68–126)
GLOBULIN PLAS-MCNC: 3.9 G/DL (ref 2.8–4.4)
GLUCOSE BLD-MCNC: 100 MG/DL (ref 70–99)
GLUCOSE UR STRIP.AUTO-MCNC: NEGATIVE MG/DL
HBA1C MFR BLD HPLC: 6.4 % (ref ?–5.7)
HCT VFR BLD AUTO: 46.2 %
HDLC SERPL-MCNC: 44 MG/DL (ref 40–59)
HGB BLD-MCNC: 14.4 G/DL
IMM GRANULOCYTES # BLD AUTO: 0.04 X10(3) UL (ref 0–1)
IMM GRANULOCYTES NFR BLD: 0.7 %
KETONES UR STRIP.AUTO-MCNC: NEGATIVE MG/DL
LDLC SERPL CALC-MCNC: 140 MG/DL (ref ?–100)
LEUKOCYTE ESTERASE UR QL STRIP.AUTO: NEGATIVE
LYMPHOCYTES # BLD AUTO: 1.9 X10(3) UL (ref 1–4)
LYMPHOCYTES NFR BLD AUTO: 30.9 %
M PROTEIN MFR SERPL ELPH: 8 G/DL (ref 6.4–8.2)
MCH RBC QN AUTO: 28 PG (ref 26–34)
MCHC RBC AUTO-ENTMCNC: 31.2 G/DL (ref 31–37)
MCV RBC AUTO: 89.7 FL
MONOCYTES # BLD AUTO: 0.61 X10(3) UL (ref 0.1–1)
MONOCYTES NFR BLD AUTO: 9.9 %
NEUTROPHILS # BLD AUTO: 3.36 X10 (3) UL (ref 1.5–7.7)
NEUTROPHILS # BLD AUTO: 3.36 X10(3) UL (ref 1.5–7.7)
NEUTROPHILS NFR BLD AUTO: 54.7 %
NITRITE UR QL STRIP.AUTO: NEGATIVE
NONHDLC SERPL-MCNC: 183 MG/DL (ref ?–130)
OSMOLALITY SERPL CALC.SUM OF ELEC: 286 MOSM/KG (ref 275–295)
PATIENT FASTING Y/N/NP: YES
PATIENT FASTING Y/N/NP: YES
PH UR STRIP.AUTO: 6 [PH] (ref 5–8)
PLATELET # BLD AUTO: 213 10(3)UL (ref 150–450)
POTASSIUM SERPL-SCNC: 3.9 MMOL/L (ref 3.5–5.1)
PROT UR STRIP.AUTO-MCNC: NEGATIVE MG/DL
RBC # BLD AUTO: 5.15 X10(6)UL
RBC UR QL AUTO: NEGATIVE
SODIUM SERPL-SCNC: 138 MMOL/L (ref 136–145)
SP GR UR STRIP.AUTO: 1.02 (ref 1–1.03)
TRIGL SERPL-MCNC: 236 MG/DL (ref 30–149)
TSI SER-ACNC: 0.86 MIU/ML (ref 0.36–3.74)
UROBILINOGEN UR STRIP.AUTO-MCNC: <2 MG/DL
VLDLC SERPL CALC-MCNC: 44 MG/DL (ref 0–30)
WBC # BLD AUTO: 6.1 X10(3) UL (ref 4–11)

## 2021-08-24 PROCEDURE — 84443 ASSAY THYROID STIM HORMONE: CPT

## 2021-08-24 PROCEDURE — 99386 PREV VISIT NEW AGE 40-64: CPT | Performed by: NURSE PRACTITIONER

## 2021-08-24 PROCEDURE — 80061 LIPID PANEL: CPT

## 2021-08-24 PROCEDURE — 3079F DIAST BP 80-89 MM HG: CPT | Performed by: NURSE PRACTITIONER

## 2021-08-24 PROCEDURE — 3008F BODY MASS INDEX DOCD: CPT | Performed by: NURSE PRACTITIONER

## 2021-08-24 PROCEDURE — 3075F SYST BP GE 130 - 139MM HG: CPT | Performed by: NURSE PRACTITIONER

## 2021-08-24 PROCEDURE — 36415 COLL VENOUS BLD VENIPUNCTURE: CPT

## 2021-08-24 PROCEDURE — 83036 HEMOGLOBIN GLYCOSYLATED A1C: CPT

## 2021-08-24 PROCEDURE — 85025 COMPLETE CBC W/AUTO DIFF WBC: CPT

## 2021-08-24 PROCEDURE — 80053 COMPREHEN METABOLIC PANEL: CPT

## 2021-08-24 PROCEDURE — 81001 URINALYSIS AUTO W/SCOPE: CPT

## 2021-08-24 RX ORDER — AMLODIPINE BESYLATE 5 MG/1
5 TABLET ORAL DAILY
Qty: 60 TABLET | Refills: 0 | Status: SHIPPED | OUTPATIENT
Start: 2021-08-24 | End: 2021-10-22

## 2021-08-24 NOTE — PROGRESS NOTES
Wellness Exam    CC: Patient is presenting for a wellness exam    HPI:   Current Complaints: New to our office. Admitted to [facility] for 5 days for COVID back in April, he has not received COVID vaccines.  He is taking blood pressure medication but he ran out Disp: , Rfl:   aspirin 81 MG Oral Tab EC, Take 81 mg by mouth daily. , Disp: , Rfl:     No current facility-administered medications on file prior to visit. Review of Systems   Constitutional: Negative for fever, chills and fatigue.    HENT: Negative tenderness. No HSM. Abdominal aorta normal in size, no hernia appreciated. Musculoskeletal: Normal range of motion. He exhibits no edema. Lymphadenopathy: He has no cervical or supraclavicular adenopathy.    : deferred  Neurological: He is alert and o There are no barriers to learning. Medical education done. Outcome: Patient verbalizes understanding.       Educated by: SWATI Abreu

## 2021-08-25 ENCOUNTER — TELEPHONE (OUTPATIENT)
Dept: INTERNAL MEDICINE CLINIC | Facility: CLINIC | Age: 56
End: 2021-08-25

## 2021-08-25 ENCOUNTER — LAB ENCOUNTER (OUTPATIENT)
Dept: LAB | Age: 56
End: 2021-08-25
Attending: INTERNAL MEDICINE
Payer: COMMERCIAL

## 2021-08-25 DIAGNOSIS — E78.00 PURE HYPERCHOLESTEROLEMIA: Primary | ICD-10-CM

## 2021-08-25 DIAGNOSIS — Z12.11 COLON CANCER SCREENING: ICD-10-CM

## 2021-08-25 PROCEDURE — 82274 ASSAY TEST FOR BLOOD FECAL: CPT

## 2021-08-25 RX ORDER — PRAVASTATIN SODIUM 40 MG
40 TABLET ORAL NIGHTLY
Qty: 90 TABLET | Refills: 0 | Status: CANCELLED | OUTPATIENT
Start: 2021-08-25

## 2021-08-25 NOTE — TELEPHONE ENCOUNTER
----- Message from HAO Gutierrez sent at 8/25/2021  8:09 AM CDT -----  Results reviewed.  Please inform patient PSA, CBC, UA, TSH, Kidney, liver, and electrolytes WNL  Increase pravastatin to moderate intensity 40 mg daily  Blood sugar is higher than

## 2021-08-27 LAB — HEMOCCULT STL QL: NEGATIVE

## 2021-08-30 NOTE — TELEPHONE ENCOUNTER
Reviewed results and recommendations w/ pt. Pt verbalized understanding. Pt states he gets myalgias from Crestor and Pravastatin, requesting a different medication.  Per Silvia Vieira pt to start Atorvastatin 20mg daily, if mylagia occurs we will do a CK level, pt

## 2021-08-31 RX ORDER — ATORVASTATIN CALCIUM 20 MG/1
20 TABLET, FILM COATED ORAL NIGHTLY
Qty: 30 TABLET | Refills: 0 | Status: SHIPPED | OUTPATIENT
Start: 2021-08-31 | End: 2021-09-17

## 2021-09-16 DIAGNOSIS — E78.00 PURE HYPERCHOLESTEROLEMIA: ICD-10-CM

## 2021-09-16 NOTE — TELEPHONE ENCOUNTER
Refill Req    [facility]    atorvastatin 20 MG Oral Tab  90 Day Supply     Fax in triage

## 2021-09-17 RX ORDER — ATORVASTATIN CALCIUM 20 MG/1
20 TABLET, FILM COATED ORAL NIGHTLY
Qty: 90 TABLET | Refills: 1 | Status: SHIPPED | OUTPATIENT
Start: 2021-09-17

## 2021-10-22 DIAGNOSIS — I10 ESSENTIAL HYPERTENSION, BENIGN: ICD-10-CM

## 2021-10-22 DIAGNOSIS — I10 ESSENTIAL HYPERTENSION, BENIGN: Primary | ICD-10-CM

## 2021-10-22 RX ORDER — AMLODIPINE BESYLATE 5 MG/1
TABLET ORAL
Qty: 60 TABLET | Refills: 0 | Status: SHIPPED | OUTPATIENT
Start: 2021-10-22 | End: 2021-12-15

## 2021-10-22 RX ORDER — METOPROLOL TARTRATE 50 MG/1
50 TABLET, FILM COATED ORAL 2 TIMES DAILY
Qty: 180 TABLET | Refills: 0 | Status: SHIPPED | OUTPATIENT
Start: 2021-10-22 | End: 2022-02-01

## 2021-10-22 NOTE — TELEPHONE ENCOUNTER
Last time medication was refilled 7/6/21  Quantity and # of refills 180 tab, no refill  Last OV 8/24/21  Next OV No appt scheduled        Sent for refill to preferred pharmacy.

## 2021-10-22 NOTE — TELEPHONE ENCOUNTER
Patient requesting refill of:  METOPROLOL TARTRATE 50 MG Oral Tab 180 tablet     To be sent to:  [id number] - [place], [place] - [address] , [phone], [phone]      Patient also requesting Amlodipine be ch

## 2021-12-15 DIAGNOSIS — I10 ESSENTIAL HYPERTENSION, BENIGN: ICD-10-CM

## 2021-12-15 RX ORDER — AMLODIPINE BESYLATE 5 MG/1
TABLET ORAL
Qty: 60 TABLET | Refills: 0 | Status: SHIPPED | OUTPATIENT
Start: 2021-12-15 | End: 2022-02-03

## 2022-02-01 ENCOUNTER — TELEPHONE (OUTPATIENT)
Dept: INTERNAL MEDICINE CLINIC | Facility: CLINIC | Age: 57
End: 2022-02-01

## 2022-02-01 RX ORDER — METOPROLOL TARTRATE 50 MG/1
TABLET, FILM COATED ORAL
Qty: 180 TABLET | Refills: 0 | Status: SHIPPED | OUTPATIENT
Start: 2022-02-01

## 2022-02-01 RX ORDER — METOPROLOL TARTRATE 50 MG/1
50 TABLET, FILM COATED ORAL 2 TIMES DAILY
Qty: 180 TABLET | Refills: 0 | Status: CANCELLED | OUTPATIENT
Start: 2022-02-01

## 2022-02-01 NOTE — TELEPHONE ENCOUNTER
Refill Req    METOPROLOL TARTRATE 50 MG Oral Tab  Req 90 Day    Please send [facility] [place]     Req for 6 mo Labs to be sent to [employer]

## 2022-02-03 RX ORDER — AMLODIPINE BESYLATE 5 MG/1
TABLET ORAL
Qty: 60 TABLET | Refills: 0 | Status: SHIPPED | OUTPATIENT
Start: 2022-02-03 | End: 2022-03-27

## 2022-02-11 ENCOUNTER — OFFICE VISIT (OUTPATIENT)
Dept: INTERNAL MEDICINE CLINIC | Facility: CLINIC | Age: 57
End: 2022-02-11
Payer: COMMERCIAL

## 2022-02-11 ENCOUNTER — LAB ENCOUNTER (OUTPATIENT)
Dept: LAB | Age: 57
End: 2022-02-11
Attending: INTERNAL MEDICINE
Payer: COMMERCIAL

## 2022-02-11 VITALS
OXYGEN SATURATION: 95 % | SYSTOLIC BLOOD PRESSURE: 130 MMHG | TEMPERATURE: 98 F | DIASTOLIC BLOOD PRESSURE: 86 MMHG | RESPIRATION RATE: 12 BRPM | HEIGHT: 70 IN | WEIGHT: 226 LBS | BODY MASS INDEX: 32.35 KG/M2 | HEART RATE: 88 BPM

## 2022-02-11 DIAGNOSIS — Z00.00 LABORATORY EXAMINATION ORDERED AS PART OF A ROUTINE GENERAL MEDICAL EXAMINATION: ICD-10-CM

## 2022-02-11 DIAGNOSIS — I10 ESSENTIAL HYPERTENSION, BENIGN: Primary | ICD-10-CM

## 2022-02-11 DIAGNOSIS — E78.00 PURE HYPERCHOLESTEROLEMIA: ICD-10-CM

## 2022-02-11 LAB
ALBUMIN SERPL-MCNC: 4.1 G/DL (ref 3.4–5)
ALBUMIN/GLOB SERPL: 1.3 {RATIO} (ref 1–2)
ALP LIVER SERPL-CCNC: 66 U/L
ALT SERPL-CCNC: 32 U/L
ANION GAP SERPL CALC-SCNC: 8 MMOL/L (ref 0–18)
AST SERPL-CCNC: 17 U/L (ref 15–37)
BASOPHILS # BLD AUTO: 0.01 X10(3) UL (ref 0–0.2)
BASOPHILS NFR BLD AUTO: 0.2 %
BILIRUB SERPL-MCNC: 0.6 MG/DL (ref 0.1–2)
BILIRUB UR QL STRIP.AUTO: NEGATIVE
BUN BLD-MCNC: 10 MG/DL (ref 7–18)
CALCIUM BLD-MCNC: 9.3 MG/DL (ref 8.5–10.1)
CHLORIDE SERPL-SCNC: 107 MMOL/L (ref 98–112)
CHOLEST SERPL-MCNC: 221 MG/DL (ref ?–200)
CLARITY UR REFRACT.AUTO: CLEAR
CO2 SERPL-SCNC: 26 MMOL/L (ref 21–32)
COLOR UR AUTO: YELLOW
COMPLEXED PSA SERPL-MCNC: 0.55 NG/ML (ref ?–4)
CREAT BLD-MCNC: 0.95 MG/DL
EOSINOPHIL # BLD AUTO: 0.19 X10(3) UL (ref 0–0.7)
EOSINOPHIL NFR BLD AUTO: 4.1 %
ERYTHROCYTE [DISTWIDTH] IN BLOOD BY AUTOMATED COUNT: 13.1 %
EST. AVERAGE GLUCOSE BLD GHB EST-MCNC: 128 MG/DL (ref 68–126)
FASTING PATIENT LIPID ANSWER: YES
FASTING STATUS PATIENT QL REPORTED: YES
GLOBULIN PLAS-MCNC: 3.2 G/DL (ref 2.8–4.4)
GLUCOSE BLD-MCNC: 96 MG/DL (ref 70–99)
GLUCOSE UR STRIP.AUTO-MCNC: NEGATIVE MG/DL
HBA1C MFR BLD: 6.1 % (ref ?–5.7)
HCT VFR BLD AUTO: 44.1 %
HDLC SERPL-MCNC: 41 MG/DL (ref 40–59)
HGB BLD-MCNC: 14 G/DL
IMM GRANULOCYTES # BLD AUTO: 0.03 X10(3) UL (ref 0–1)
IMM GRANULOCYTES NFR BLD: 0.6 %
KETONES UR STRIP.AUTO-MCNC: NEGATIVE MG/DL
LDLC SERPL CALC-MCNC: 142 MG/DL (ref ?–100)
LEUKOCYTE ESTERASE UR QL STRIP.AUTO: NEGATIVE
LYMPHOCYTES # BLD AUTO: 1.61 X10(3) UL (ref 1–4)
LYMPHOCYTES NFR BLD AUTO: 34.4 %
MCH RBC QN AUTO: 27.4 PG (ref 26–34)
MCHC RBC AUTO-ENTMCNC: 31.7 G/DL (ref 31–37)
MCV RBC AUTO: 86.3 FL
MONOCYTES # BLD AUTO: 0.41 X10(3) UL (ref 0.1–1)
MONOCYTES NFR BLD AUTO: 8.8 %
NEUTROPHILS # BLD AUTO: 2.43 X10 (3) UL (ref 1.5–7.7)
NEUTROPHILS # BLD AUTO: 2.43 X10(3) UL (ref 1.5–7.7)
NEUTROPHILS NFR BLD AUTO: 51.9 %
NITRITE UR QL STRIP.AUTO: NEGATIVE
NONHDLC SERPL-MCNC: 180 MG/DL (ref ?–130)
OSMOLALITY SERPL CALC.SUM OF ELEC: 291 MOSM/KG (ref 275–295)
PH UR STRIP.AUTO: 6 [PH] (ref 5–8)
PLATELET # BLD AUTO: 195 10(3)UL (ref 150–450)
POTASSIUM SERPL-SCNC: 4 MMOL/L (ref 3.5–5.1)
PROT SERPL-MCNC: 7.3 G/DL (ref 6.4–8.2)
PROT UR STRIP.AUTO-MCNC: NEGATIVE MG/DL
RBC # BLD AUTO: 5.11 X10(6)UL
RBC UR QL AUTO: NEGATIVE
SP GR UR STRIP.AUTO: 1.02 (ref 1–1.03)
TRIGL SERPL-MCNC: 208 MG/DL (ref 30–149)
TSI SER-ACNC: 1.08 MIU/ML (ref 0.36–3.74)
UROBILINOGEN UR STRIP.AUTO-MCNC: <2 MG/DL
VLDLC SERPL CALC-MCNC: 39 MG/DL (ref 0–30)
WBC # BLD AUTO: 4.7 X10(3) UL (ref 4–11)

## 2022-02-11 PROCEDURE — 83036 HEMOGLOBIN GLYCOSYLATED A1C: CPT

## 2022-02-11 PROCEDURE — 85025 COMPLETE CBC W/AUTO DIFF WBC: CPT

## 2022-02-11 PROCEDURE — 3075F SYST BP GE 130 - 139MM HG: CPT | Performed by: PHYSICIAN ASSISTANT

## 2022-02-11 PROCEDURE — 80053 COMPREHEN METABOLIC PANEL: CPT

## 2022-02-11 PROCEDURE — 84443 ASSAY THYROID STIM HORMONE: CPT

## 2022-02-11 PROCEDURE — 36415 COLL VENOUS BLD VENIPUNCTURE: CPT

## 2022-02-11 PROCEDURE — 80061 LIPID PANEL: CPT

## 2022-02-11 PROCEDURE — 3008F BODY MASS INDEX DOCD: CPT | Performed by: PHYSICIAN ASSISTANT

## 2022-02-11 PROCEDURE — 99214 OFFICE O/P EST MOD 30 MIN: CPT | Performed by: PHYSICIAN ASSISTANT

## 2022-02-11 PROCEDURE — 81003 URINALYSIS AUTO W/O SCOPE: CPT

## 2022-02-11 PROCEDURE — 3079F DIAST BP 80-89 MM HG: CPT | Performed by: PHYSICIAN ASSISTANT

## 2022-02-11 RX ORDER — CHLORAL HYDRATE 500 MG
1000 CAPSULE ORAL DAILY
COMMUNITY

## 2022-02-11 RX ORDER — PRAVASTATIN SODIUM 20 MG
20 TABLET ORAL NIGHTLY
COMMUNITY

## 2022-02-11 NOTE — PATIENT INSTRUCTIONS
Check last TDAP (tetanus shot) date  COVID booster is due next month    Continue current medications   Schedule your screening colonoscopy

## 2022-03-27 DIAGNOSIS — I10 ESSENTIAL HYPERTENSION, BENIGN: ICD-10-CM

## 2022-03-27 RX ORDER — AMLODIPINE BESYLATE 5 MG/1
TABLET ORAL
Qty: 90 TABLET | Refills: 1 | Status: SHIPPED | OUTPATIENT
Start: 2022-03-27 | End: 2022-10-12

## 2022-04-18 RX ORDER — METOPROLOL TARTRATE 50 MG/1
TABLET, FILM COATED ORAL
Qty: 180 TABLET | Refills: 0 | Status: SHIPPED | OUTPATIENT
Start: 2022-04-18

## 2022-04-18 NOTE — TELEPHONE ENCOUNTER
Last time medication was refilled  2/1/22  Quantity and # of refills 180 tabs w/0 refills  Last OV  2/11/22  Next OV No future appointments.

## 2022-07-17 DIAGNOSIS — I10 ESSENTIAL HYPERTENSION, BENIGN: ICD-10-CM

## 2022-07-18 RX ORDER — METOPROLOL TARTRATE 50 MG/1
TABLET, FILM COATED ORAL
Qty: 180 TABLET | Refills: 0 | Status: SHIPPED | OUTPATIENT
Start: 2022-07-18

## 2022-08-18 ENCOUNTER — TELEPHONE (OUTPATIENT)
Dept: CASE MANAGEMENT | Age: 57
End: 2022-08-18

## 2022-08-18 DIAGNOSIS — Z12.11 COLON CANCER SCREENING: Primary | ICD-10-CM

## 2022-10-12 DIAGNOSIS — I10 ESSENTIAL HYPERTENSION, BENIGN: ICD-10-CM

## 2022-10-12 RX ORDER — AMLODIPINE BESYLATE 5 MG/1
TABLET ORAL
Qty: 90 TABLET | Refills: 0 | Status: SHIPPED | OUTPATIENT
Start: 2022-10-12

## 2022-10-12 NOTE — TELEPHONE ENCOUNTER
Last time medication was refilled 3/27/22   Quantity and # of refills  90 tabs w/1 refill   Last OV  2/11/22  Next OV No future appointments. Patient due for follow up appointment, this can be scheduled as px as he is due. Called patient and attempted to schedule appointment however pt states he is very busy with work and unable to schedule at this time.

## 2022-10-24 DIAGNOSIS — I10 ESSENTIAL HYPERTENSION, BENIGN: ICD-10-CM

## 2022-10-24 RX ORDER — METOPROLOL TARTRATE 50 MG/1
TABLET, FILM COATED ORAL
Qty: 180 TABLET | Refills: 0 | Status: SHIPPED | OUTPATIENT
Start: 2022-10-24

## 2022-10-27 ENCOUNTER — TELEPHONE (OUTPATIENT)
Dept: INTERNAL MEDICINE CLINIC | Facility: CLINIC | Age: 57
End: 2022-10-27

## 2022-10-27 DIAGNOSIS — I10 ESSENTIAL HYPERTENSION, BENIGN: Primary | ICD-10-CM

## 2022-10-27 DIAGNOSIS — E78.00 PURE HYPERCHOLESTEROLEMIA: ICD-10-CM

## 2022-10-27 DIAGNOSIS — R73.03 PREDIABETES: ICD-10-CM

## 2022-10-27 NOTE — TELEPHONE ENCOUNTER
Pt aware due for annual physical and lab work. Appt scheduled for 11/5/22 with Rigoberto Frausto. Pt would like to have labs done prior to his visit.     Please send orders to THE [facility] as wants to do them 10/29

## 2022-10-29 ENCOUNTER — LAB ENCOUNTER (OUTPATIENT)
Dept: LAB | Age: 57
End: 2022-10-29
Attending: INTERNAL MEDICINE
Payer: COMMERCIAL

## 2022-10-29 DIAGNOSIS — E78.00 PURE HYPERCHOLESTEROLEMIA: ICD-10-CM

## 2022-10-29 DIAGNOSIS — I10 ESSENTIAL HYPERTENSION, BENIGN: ICD-10-CM

## 2022-10-29 DIAGNOSIS — R73.03 PREDIABETES: ICD-10-CM

## 2022-10-29 LAB
ALBUMIN SERPL-MCNC: 4.1 G/DL (ref 3.4–5)
ALBUMIN/GLOB SERPL: 1.1 {RATIO} (ref 1–2)
ALP LIVER SERPL-CCNC: 64 U/L
ALT SERPL-CCNC: 52 U/L
ANION GAP SERPL CALC-SCNC: 9 MMOL/L (ref 0–18)
AST SERPL-CCNC: 29 U/L (ref 15–37)
BILIRUB SERPL-MCNC: 0.6 MG/DL (ref 0.1–2)
BILIRUB UR QL STRIP.AUTO: NEGATIVE
BUN BLD-MCNC: 14 MG/DL (ref 7–18)
CALCIUM BLD-MCNC: 9.3 MG/DL (ref 8.5–10.1)
CHLORIDE SERPL-SCNC: 105 MMOL/L (ref 98–112)
CHOLEST SERPL-MCNC: 221 MG/DL (ref ?–200)
CLARITY UR REFRACT.AUTO: CLEAR
CO2 SERPL-SCNC: 24 MMOL/L (ref 21–32)
COLOR UR AUTO: YELLOW
CREAT BLD-MCNC: 0.9 MG/DL
EST. AVERAGE GLUCOSE BLD GHB EST-MCNC: 134 MG/DL (ref 68–126)
FASTING PATIENT LIPID ANSWER: YES
FASTING STATUS PATIENT QL REPORTED: YES
GFR SERPLBLD BASED ON 1.73 SQ M-ARVRAT: 100 ML/MIN/1.73M2 (ref 60–?)
GLOBULIN PLAS-MCNC: 3.9 G/DL (ref 2.8–4.4)
GLUCOSE BLD-MCNC: 120 MG/DL (ref 70–99)
GLUCOSE UR STRIP.AUTO-MCNC: NEGATIVE MG/DL
HBA1C MFR BLD: 6.3 % (ref ?–5.7)
HDLC SERPL-MCNC: 37 MG/DL (ref 40–59)
KETONES UR STRIP.AUTO-MCNC: NEGATIVE MG/DL
LDLC SERPL CALC-MCNC: 143 MG/DL (ref ?–100)
LEUKOCYTE ESTERASE UR QL STRIP.AUTO: NEGATIVE
NITRITE UR QL STRIP.AUTO: NEGATIVE
NONHDLC SERPL-MCNC: 184 MG/DL (ref ?–130)
OSMOLALITY SERPL CALC.SUM OF ELEC: 288 MOSM/KG (ref 275–295)
PH UR STRIP.AUTO: 5 [PH] (ref 5–8)
POTASSIUM SERPL-SCNC: 4 MMOL/L (ref 3.5–5.1)
PROT SERPL-MCNC: 8 G/DL (ref 6.4–8.2)
PROT UR STRIP.AUTO-MCNC: NEGATIVE MG/DL
RBC UR QL AUTO: NEGATIVE
SODIUM SERPL-SCNC: 138 MMOL/L (ref 136–145)
SP GR UR STRIP.AUTO: 1.01 (ref 1–1.03)
TRIGL SERPL-MCNC: 224 MG/DL (ref 30–149)
UROBILINOGEN UR STRIP.AUTO-MCNC: <2 MG/DL
VLDLC SERPL CALC-MCNC: 42 MG/DL (ref 0–30)

## 2022-10-29 PROCEDURE — 81003 URINALYSIS AUTO W/O SCOPE: CPT

## 2022-10-29 PROCEDURE — 83036 HEMOGLOBIN GLYCOSYLATED A1C: CPT

## 2022-10-29 PROCEDURE — 36415 COLL VENOUS BLD VENIPUNCTURE: CPT

## 2022-10-29 PROCEDURE — 80053 COMPREHEN METABOLIC PANEL: CPT

## 2022-10-29 PROCEDURE — 80061 LIPID PANEL: CPT

## 2022-11-05 ENCOUNTER — OFFICE VISIT (OUTPATIENT)
Dept: INTERNAL MEDICINE CLINIC | Facility: CLINIC | Age: 57
End: 2022-11-05
Payer: COMMERCIAL

## 2022-11-05 VITALS
SYSTOLIC BLOOD PRESSURE: 128 MMHG | OXYGEN SATURATION: 98 % | TEMPERATURE: 98 F | DIASTOLIC BLOOD PRESSURE: 88 MMHG | WEIGHT: 223 LBS | HEIGHT: 70 IN | BODY MASS INDEX: 31.92 KG/M2 | HEART RATE: 77 BPM | RESPIRATION RATE: 16 BRPM

## 2022-11-05 DIAGNOSIS — E78.00 PURE HYPERCHOLESTEROLEMIA: ICD-10-CM

## 2022-11-05 DIAGNOSIS — I10 ESSENTIAL HYPERTENSION, BENIGN: ICD-10-CM

## 2022-11-05 DIAGNOSIS — Z00.00 ANNUAL PHYSICAL EXAM: Primary | ICD-10-CM

## 2022-11-05 PROCEDURE — 3008F BODY MASS INDEX DOCD: CPT | Performed by: NURSE PRACTITIONER

## 2022-11-05 PROCEDURE — 3074F SYST BP LT 130 MM HG: CPT | Performed by: NURSE PRACTITIONER

## 2022-11-05 PROCEDURE — 99396 PREV VISIT EST AGE 40-64: CPT | Performed by: NURSE PRACTITIONER

## 2022-11-05 PROCEDURE — 3079F DIAST BP 80-89 MM HG: CPT | Performed by: NURSE PRACTITIONER

## 2022-11-05 RX ORDER — METOPROLOL TARTRATE 50 MG/1
50 TABLET, FILM COATED ORAL 2 TIMES DAILY
Qty: 180 TABLET | Refills: 1 | Status: SHIPPED | OUTPATIENT
Start: 2022-11-05

## 2022-11-05 RX ORDER — AMLODIPINE BESYLATE 5 MG/1
5 TABLET ORAL DAILY
Qty: 90 TABLET | Refills: 1 | Status: SHIPPED | OUTPATIENT
Start: 2022-11-05

## 2022-11-05 RX ORDER — PRAVASTATIN SODIUM 20 MG
20 TABLET ORAL NIGHTLY
Qty: 90 TABLET | Refills: 1 | Status: SHIPPED | OUTPATIENT
Start: 2022-11-05

## 2022-11-08 ENCOUNTER — TELEPHONE (OUTPATIENT)
Dept: INTERNAL MEDICINE CLINIC | Facility: CLINIC | Age: 57
End: 2022-11-08

## 2022-11-08 NOTE — TELEPHONE ENCOUNTER
[facility] is following up on lab order for colon cancer screening that was mailed to pt. Please encourage pt to complete this test within the next 2 wks. Pt notified and verbalized understanding.

## 2022-11-23 ENCOUNTER — LAB ENCOUNTER (OUTPATIENT)
Dept: LAB | Age: 57
End: 2022-11-23
Attending: INTERNAL MEDICINE
Payer: COMMERCIAL

## 2023-05-13 ENCOUNTER — HOSPITAL ENCOUNTER (OUTPATIENT)
Dept: GENERAL RADIOLOGY | Age: 58
Discharge: HOME | End: 2023-05-13
Attending: INTERNAL MEDICINE
Payer: COMMERCIAL

## 2023-05-13 ENCOUNTER — OFFICE VISIT (OUTPATIENT)
Dept: INTERNAL MEDICINE CLINIC | Facility: CLINIC | Age: 58
End: 2023-05-13
Payer: COMMERCIAL

## 2023-05-13 ENCOUNTER — LAB ENCOUNTER (OUTPATIENT)
Dept: LAB | Age: 58
End: 2023-05-13
Attending: INTERNAL MEDICINE
Payer: COMMERCIAL

## 2023-05-13 VITALS
OXYGEN SATURATION: 99 % | DIASTOLIC BLOOD PRESSURE: 84 MMHG | HEART RATE: 76 BPM | WEIGHT: 222 LBS | RESPIRATION RATE: 16 BRPM | BODY MASS INDEX: 32 KG/M2 | SYSTOLIC BLOOD PRESSURE: 130 MMHG | TEMPERATURE: 98 F

## 2023-05-13 DIAGNOSIS — I10 ESSENTIAL HYPERTENSION, BENIGN: ICD-10-CM

## 2023-05-13 DIAGNOSIS — Z23 NEED FOR TDAP VACCINATION: ICD-10-CM

## 2023-05-13 DIAGNOSIS — G56.01 RIGHT CARPAL TUNNEL SYNDROME: ICD-10-CM

## 2023-05-13 DIAGNOSIS — M79.641 RIGHT HAND PAIN: ICD-10-CM

## 2023-05-13 DIAGNOSIS — I10 ESSENTIAL HYPERTENSION, BENIGN: Primary | ICD-10-CM

## 2023-05-13 LAB
ALBUMIN SERPL-MCNC: 4.1 G/DL (ref 3.4–5)
ALBUMIN/GLOB SERPL: 1.1 {RATIO} (ref 1–2)
ALP LIVER SERPL-CCNC: 54 U/L
ALT SERPL-CCNC: 37 U/L
ANION GAP SERPL CALC-SCNC: 2 MMOL/L (ref 0–18)
AST SERPL-CCNC: 24 U/L (ref 15–37)
BILIRUB SERPL-MCNC: 0.6 MG/DL (ref 0.1–2)
BUN BLD-MCNC: 16 MG/DL (ref 7–18)
CALCIUM BLD-MCNC: 9.3 MG/DL (ref 8.5–10.1)
CHLORIDE SERPL-SCNC: 107 MMOL/L (ref 98–112)
CHOLEST SERPL-MCNC: 226 MG/DL (ref ?–200)
CO2 SERPL-SCNC: 26 MMOL/L (ref 21–32)
CREAT BLD-MCNC: 0.88 MG/DL
FASTING PATIENT LIPID ANSWER: YES
FASTING STATUS PATIENT QL REPORTED: YES
GFR SERPLBLD BASED ON 1.73 SQ M-ARVRAT: 100 ML/MIN/1.73M2 (ref 60–?)
GLOBULIN PLAS-MCNC: 3.9 G/DL (ref 2.8–4.4)
GLUCOSE BLD-MCNC: 117 MG/DL (ref 70–99)
HDLC SERPL-MCNC: 43 MG/DL (ref 40–59)
LDLC SERPL CALC-MCNC: 151 MG/DL (ref ?–100)
NONHDLC SERPL-MCNC: 183 MG/DL (ref ?–130)
OSMOLALITY SERPL CALC.SUM OF ELEC: 282 MOSM/KG (ref 275–295)
POTASSIUM SERPL-SCNC: 3.9 MMOL/L (ref 3.5–5.1)
PROT SERPL-MCNC: 8 G/DL (ref 6.4–8.2)
SODIUM SERPL-SCNC: 135 MMOL/L (ref 136–145)
TRIGL SERPL-MCNC: 177 MG/DL (ref 30–149)
VLDLC SERPL CALC-MCNC: 34 MG/DL (ref 0–30)

## 2023-05-13 PROCEDURE — 36415 COLL VENOUS BLD VENIPUNCTURE: CPT

## 2023-05-13 PROCEDURE — 3075F SYST BP GE 130 - 139MM HG: CPT | Performed by: INTERNAL MEDICINE

## 2023-05-13 PROCEDURE — 90715 TDAP VACCINE 7 YRS/> IM: CPT | Performed by: INTERNAL MEDICINE

## 2023-05-13 PROCEDURE — 3079F DIAST BP 80-89 MM HG: CPT | Performed by: INTERNAL MEDICINE

## 2023-05-13 PROCEDURE — 80061 LIPID PANEL: CPT

## 2023-05-13 PROCEDURE — 73130 X-RAY EXAM OF HAND: CPT | Performed by: INTERNAL MEDICINE

## 2023-05-13 PROCEDURE — 90471 IMMUNIZATION ADMIN: CPT | Performed by: INTERNAL MEDICINE

## 2023-05-13 PROCEDURE — 99213 OFFICE O/P EST LOW 20 MIN: CPT | Performed by: INTERNAL MEDICINE

## 2023-05-13 PROCEDURE — 80053 COMPREHEN METABOLIC PANEL: CPT

## 2023-05-14 PROBLEM — Z91.89 FRAMINGHAM CARDIAC RISK 10-20% IN NEXT 10 YEARS: Status: ACTIVE | Noted: 2023-05-14

## 2023-05-15 DIAGNOSIS — E78.00 PURE HYPERCHOLESTEROLEMIA: ICD-10-CM

## 2023-05-15 DIAGNOSIS — I10 ESSENTIAL HYPERTENSION, BENIGN: Primary | ICD-10-CM

## 2023-05-15 DIAGNOSIS — Z91.89 FRAMINGHAM CARDIAC RISK 10-20% IN NEXT 10 YEARS: ICD-10-CM

## 2023-05-15 NOTE — PROGRESS NOTES
Spoke to pt. Made aware of results & recommendations. Pt voiced understanding.   Pt is not taking his cholesterol meds, taking OTC nature made brand cholesterol plus  Pt stated he was having lot of pain in his hands and legs  Advised to start taking Pravastatin and repeat labs prior to his next appt in November  Pt v/u  Advised to complete heart scan, information given to schedule for heart scan  Pt v/u and requested to send print out of his results

## 2023-06-26 ENCOUNTER — TELEPHONE (OUTPATIENT)
Dept: INTERNAL MEDICINE CLINIC | Facility: CLINIC | Age: 58
End: 2023-06-26

## 2023-06-26 DIAGNOSIS — Z00.00 ANNUAL PHYSICAL EXAM: Primary | ICD-10-CM

## 2023-06-26 NOTE — TELEPHONE ENCOUNTER
Preferred Lab: [facility]: 5/13/23  Next OV & Reason:  11/11/23 annual physical   Patient was notified to fast 8-10 hours drinking only water/black coffee prior to having labs drawn and may need to submit urine sample. Hemoglobin A1C will be ordered if patient has diabetes or prediabetes.   Lab orders will be placed by end of day:  yes  Patient requesting return call:  no     Would also like to get his heart checked out-does not remember getting heart scan completed and no information in chart

## 2023-08-12 ENCOUNTER — LAB ENCOUNTER (OUTPATIENT)
Dept: LAB | Age: 58
End: 2023-08-12
Attending: INTERNAL MEDICINE
Payer: COMMERCIAL

## 2023-08-12 DIAGNOSIS — Z91.89 FRAMINGHAM CARDIAC RISK 10-20% IN NEXT 10 YEARS: ICD-10-CM

## 2023-08-12 DIAGNOSIS — E78.00 PURE HYPERCHOLESTEROLEMIA: ICD-10-CM

## 2023-08-12 DIAGNOSIS — Z00.00 ANNUAL PHYSICAL EXAM: ICD-10-CM

## 2023-08-12 LAB
ALBUMIN SERPL-MCNC: 4.2 G/DL (ref 3.4–5)
ALBUMIN/GLOB SERPL: 1.2 {RATIO} (ref 1–2)
ALP LIVER SERPL-CCNC: 63 U/L
ALT SERPL-CCNC: 36 U/L
ANION GAP SERPL CALC-SCNC: 3 MMOL/L (ref 0–18)
AST SERPL-CCNC: 27 U/L (ref 15–37)
BASOPHILS # BLD AUTO: 0.02 X10(3) UL (ref 0–0.2)
BASOPHILS NFR BLD AUTO: 0.4 %
BILIRUB SERPL-MCNC: 0.9 MG/DL (ref 0.1–2)
BILIRUB UR QL STRIP.AUTO: NEGATIVE
BUN BLD-MCNC: 16 MG/DL (ref 7–18)
CALCIUM BLD-MCNC: 9.3 MG/DL (ref 8.5–10.1)
CHLORIDE SERPL-SCNC: 108 MMOL/L (ref 98–112)
CHOLEST SERPL-MCNC: 219 MG/DL (ref ?–200)
CO2 SERPL-SCNC: 28 MMOL/L (ref 21–32)
CREAT BLD-MCNC: 0.91 MG/DL
EGFRCR SERPLBLD CKD-EPI 2021: 98 ML/MIN/1.73M2 (ref 60–?)
EOSINOPHIL # BLD AUTO: 0.22 X10(3) UL (ref 0–0.7)
EOSINOPHIL NFR BLD AUTO: 3.9 %
ERYTHROCYTE [DISTWIDTH] IN BLOOD BY AUTOMATED COUNT: 13.7 %
FASTING PATIENT LIPID ANSWER: YES
FASTING STATUS PATIENT QL REPORTED: YES
GLOBULIN PLAS-MCNC: 3.6 G/DL (ref 2.8–4.4)
GLUCOSE BLD-MCNC: 108 MG/DL (ref 70–99)
GLUCOSE UR STRIP.AUTO-MCNC: NEGATIVE MG/DL
HCT VFR BLD AUTO: 42.7 %
HDLC SERPL-MCNC: 46 MG/DL (ref 40–59)
HGB BLD-MCNC: 13.9 G/DL
IMM GRANULOCYTES # BLD AUTO: 0.02 X10(3) UL (ref 0–1)
IMM GRANULOCYTES NFR BLD: 0.4 %
KETONES UR STRIP.AUTO-MCNC: NEGATIVE MG/DL
LDLC SERPL CALC-MCNC: 143 MG/DL (ref ?–100)
LEUKOCYTE ESTERASE UR QL STRIP.AUTO: NEGATIVE
LYMPHOCYTES # BLD AUTO: 1.87 X10(3) UL (ref 1–4)
LYMPHOCYTES NFR BLD AUTO: 33.2 %
MCH RBC QN AUTO: 27.9 PG (ref 26–34)
MCHC RBC AUTO-ENTMCNC: 32.6 G/DL (ref 31–37)
MCV RBC AUTO: 85.7 FL
MONOCYTES # BLD AUTO: 0.6 X10(3) UL (ref 0.1–1)
MONOCYTES NFR BLD AUTO: 10.7 %
NEUTROPHILS # BLD AUTO: 2.9 X10 (3) UL (ref 1.5–7.7)
NEUTROPHILS # BLD AUTO: 2.9 X10(3) UL (ref 1.5–7.7)
NEUTROPHILS NFR BLD AUTO: 51.4 %
NITRITE UR QL STRIP.AUTO: NEGATIVE
NONHDLC SERPL-MCNC: 173 MG/DL (ref ?–130)
OSMOLALITY SERPL CALC.SUM OF ELEC: 290 MOSM/KG (ref 275–295)
PH UR STRIP.AUTO: 5 [PH] (ref 5–8)
PLATELET # BLD AUTO: 209 10(3)UL (ref 150–450)
POTASSIUM SERPL-SCNC: 3.9 MMOL/L (ref 3.5–5.1)
PROT SERPL-MCNC: 7.8 G/DL (ref 6.4–8.2)
PROT UR STRIP.AUTO-MCNC: NEGATIVE MG/DL
RBC # BLD AUTO: 4.98 X10(6)UL
RBC UR QL AUTO: NEGATIVE
SODIUM SERPL-SCNC: 139 MMOL/L (ref 136–145)
SP GR UR STRIP.AUTO: 1.02 (ref 1–1.03)
TRIGL SERPL-MCNC: 165 MG/DL (ref 30–149)
UROBILINOGEN UR STRIP.AUTO-MCNC: <2 MG/DL
VLDLC SERPL CALC-MCNC: 31 MG/DL (ref 0–30)
WBC # BLD AUTO: 5.6 X10(3) UL (ref 4–11)
WBC CLUMPS UR QL AUTO: PRESENT /HPF

## 2023-08-12 PROCEDURE — 81001 URINALYSIS AUTO W/SCOPE: CPT

## 2023-08-12 PROCEDURE — 80061 LIPID PANEL: CPT

## 2023-08-12 PROCEDURE — 36415 COLL VENOUS BLD VENIPUNCTURE: CPT

## 2023-08-12 PROCEDURE — 80053 COMPREHEN METABOLIC PANEL: CPT

## 2023-08-12 PROCEDURE — 85025 COMPLETE CBC W/AUTO DIFF WBC: CPT

## 2023-08-14 DIAGNOSIS — R31.9 HEMATURIA, UNSPECIFIED TYPE: Primary | ICD-10-CM

## 2023-08-14 NOTE — PROGRESS NOTES
Spoke to pt. Made aware of results & recommendations. Pt voiced understanding.   Pt was given information to call CS to schedule the test

## 2023-08-25 ENCOUNTER — ORDER TRANSCRIPTION (OUTPATIENT)
Dept: ADMINISTRATIVE | Facility: HOSPITAL | Age: 58
End: 2023-08-25

## 2023-08-25 DIAGNOSIS — Z13.6 SCREENING FOR CARDIOVASCULAR CONDITION: Primary | ICD-10-CM

## 2023-09-08 ENCOUNTER — HOSPITAL ENCOUNTER (OUTPATIENT)
Dept: CT IMAGING | Age: 58
Discharge: HOME | End: 2023-09-08
Attending: INTERNAL MEDICINE
Payer: COMMERCIAL

## 2023-09-08 DIAGNOSIS — R31.9 HEMATURIA, UNSPECIFIED TYPE: ICD-10-CM

## 2023-09-08 PROCEDURE — 76377 3D RENDER W/INTRP POSTPROCES: CPT | Performed by: INTERNAL MEDICINE

## 2023-09-08 PROCEDURE — 74178 CT ABD&PLV WO CNTR FLWD CNTR: CPT | Performed by: INTERNAL MEDICINE

## 2023-09-09 ENCOUNTER — HOSPITAL ENCOUNTER (OUTPATIENT)
Dept: CT IMAGING | Age: 58
Discharge: HOME | End: 2023-09-09
Attending: INTERNAL MEDICINE

## 2023-09-09 DIAGNOSIS — Z13.6 SCREENING FOR CARDIOVASCULAR CONDITION: ICD-10-CM

## 2023-09-09 NOTE — PROGRESS NOTES
Date of Service 2023    Irma Maurer  Date of Birth 3/30/1965    Patient Age: 62year old    PCP: Jesus Duarte MD   [address]  [address] [address]    Heart Scan Consult  Preliminary Heart Scan Score: 152.04    Previous Screening  Heart Scan Completed Previously: No                   Risk Factors  Personal Risk Factors  Non-alterable Risk Factors: Family History (Mother heart disease -  from MI at [de-identified]. )      Body Mass Index  There is no height or weight on file to calculate BMI. BMI 31    Blood Pressure  There were no vitals taken for this visit. /84 on medication. (Normal =< 120/80,  Elevated = 120-129/ >80,  High Stage1 130-139/80-89 , Stage2 >140/>90)    Lipid Profile  Cholesterol: 219, done on 2023. HDL Cholesterol: 46, done on 2023. LDL Cholesterol: 143, done on 2023. TriGlycerides 165, done on 2023. Patient states that he takes his cholesterol medication but I think he is taking over the counter cholesterol med. He continues to say that he has pain in his arms, hands and legs. I do not think that he takes his statin because of the pain. Recommended that he discuss with Dr. Anabelle Contreras. He has not had a LDL below 100 and I went back to 2017. Cholesterol Goals  Value   Total  =< 200   HDL  = > 45 Men = > 55 Women   LDL   =< 100   Triglycerides  =< 150       Glucose and Hemoglobin A1C  Patient states that he does not have any diabetes. I tried to explain about Pre diabetes and the A1c. He has had A1c's in the prediabetes range for several years. Recommended that he discuss this with Dr. Anabelle Contreras. Lab Results   Component Value Date     (H) 2023    A1C 6.3 (H) 10/29/2022     (Normal Fasting Glucose < 100mg/dl )    Nurse Review  Risk factor information and results reviewed with Nurse: Yes  Noted that patient shows appts with Dr. Anabelle Contreras for Physical in September and November.   Patient plans to go to appointment on Friday 9/15/23 at 3pm.    Recommended Follow Up:  Consult your physician regarding[de-identified] Final Heart Scan Report; Discuss potential for Incidental Finding    DISCUSS WITH DR. ROLAND:  Your Heart Scan Calcium Score of 152                                                    Your LDL component of Cholesterol - last reading was 143. This is where the prescription cholesterol medication, Pravastatin, works. The goal is to have the LDL below 100. Your Pre Diabetes lab result A1c 6.3. Normal is below 5.7. Recommendations for Change:  Nutrition Changes: Low Saturated Fat;Low Fat Dairy; Increase Fiber; Low Salt Eating    Cholesterol Modification (goal of therapy depends upon your risk): Increase HDL (Healthy/Good) Normal >45 Men >55 Women;Decrease LDL (Lousy/Bad) Ideal <100;Decrease Triglycerides (Ugly) Normal <150    Exercise: Enhance Current Program    Smoking Cessation: > 1 Year Ago    Weight Management: Decrease Current Weight    Stress Management: Adopt Stress Management Techniques    Repeat Heart Scan: 3 Years if Calcium Score is > 0. 0; Discuss with your Physician              [place] Recommended Resources:  Recommended Resources: Upcoming Classes, Medical Services and Health Library [email or web address]. Daiana Warren RN        Please Contact the Nurse Heart Line with any Questions or Concerns [phone].

## 2023-09-11 DIAGNOSIS — I77.810 DILATATION OF THORACIC AORTA (HCC): Primary | ICD-10-CM

## 2023-09-11 DIAGNOSIS — R91.8 LUNG NODULES: ICD-10-CM

## 2023-09-11 PROBLEM — I71.21 ANEURYSM OF ASCENDING AORTA WITHOUT RUPTURE: Status: ACTIVE | Noted: 2023-09-11

## 2023-09-11 PROBLEM — I71.21 ANEURYSM OF ASCENDING AORTA WITHOUT RUPTURE (HCC): Status: ACTIVE | Noted: 2023-09-11

## 2023-09-15 ENCOUNTER — HOSPITAL ENCOUNTER (OUTPATIENT)
Dept: CT IMAGING | Age: 58
Discharge: HOME | End: 2023-09-15
Attending: INTERNAL MEDICINE
Payer: COMMERCIAL

## 2023-09-15 DIAGNOSIS — I77.810 DILATATION OF THORACIC AORTA (HCC): ICD-10-CM

## 2023-09-15 DIAGNOSIS — R91.8 LUNG NODULES: ICD-10-CM

## 2023-09-15 LAB
CREAT BLD-MCNC: 1 MG/DL
EGFRCR SERPLBLD CKD-EPI 2021: 87 ML/MIN/1.73M2 (ref 60–?)

## 2023-09-15 PROCEDURE — 71275 CT ANGIOGRAPHY CHEST: CPT | Performed by: INTERNAL MEDICINE

## 2023-09-15 PROCEDURE — 82565 ASSAY OF CREATININE: CPT

## 2023-09-16 ENCOUNTER — OFFICE VISIT (OUTPATIENT)
Dept: INTERNAL MEDICINE CLINIC | Facility: CLINIC | Age: 58
End: 2023-09-16
Payer: COMMERCIAL

## 2023-09-16 VITALS
OXYGEN SATURATION: 97 % | RESPIRATION RATE: 16 BRPM | TEMPERATURE: 97 F | DIASTOLIC BLOOD PRESSURE: 85 MMHG | HEART RATE: 73 BPM | WEIGHT: 222 LBS | HEIGHT: 70 IN | BODY MASS INDEX: 31.78 KG/M2 | SYSTOLIC BLOOD PRESSURE: 130 MMHG

## 2023-09-16 DIAGNOSIS — Z12.11 COLON CANCER SCREENING: ICD-10-CM

## 2023-09-16 DIAGNOSIS — E78.00 PURE HYPERCHOLESTEROLEMIA: ICD-10-CM

## 2023-09-16 DIAGNOSIS — R59.0 LYMPHADENOPATHY, MEDIASTINAL: ICD-10-CM

## 2023-09-16 DIAGNOSIS — R91.1 LUNG NODULE: ICD-10-CM

## 2023-09-16 DIAGNOSIS — Z00.00 ANNUAL PHYSICAL EXAM: Primary | ICD-10-CM

## 2023-09-16 DIAGNOSIS — R91.8 PULMONARY NODULES: ICD-10-CM

## 2023-09-16 DIAGNOSIS — Z91.89 FRAMINGHAM CARDIAC RISK 10-20% IN NEXT 10 YEARS: ICD-10-CM

## 2023-09-16 PROCEDURE — 3075F SYST BP GE 130 - 139MM HG: CPT

## 2023-09-16 PROCEDURE — 3008F BODY MASS INDEX DOCD: CPT

## 2023-09-16 PROCEDURE — 3079F DIAST BP 80-89 MM HG: CPT

## 2023-09-16 PROCEDURE — 99396 PREV VISIT EST AGE 40-64: CPT

## 2023-09-16 RX ORDER — PRAVASTATIN SODIUM 40 MG
40 TABLET ORAL NIGHTLY
Qty: 90 TABLET | Refills: 1 | Status: SHIPPED | OUTPATIENT
Start: 2023-09-16 | End: 2024-03-14

## 2023-09-29 ENCOUNTER — HOSPITAL ENCOUNTER (OUTPATIENT)
Dept: NUCLEAR MEDICINE | Facility: HOSPITAL | Age: 58
Discharge: HOME | End: 2023-09-29
Payer: COMMERCIAL

## 2023-09-29 DIAGNOSIS — R91.8 PULMONARY NODULES: ICD-10-CM

## 2023-09-29 DIAGNOSIS — R59.0 LYMPHADENOPATHY, MEDIASTINAL: ICD-10-CM

## 2023-09-29 LAB — GLUCOSE BLD-MCNC: 102 MG/DL (ref 70–99)

## 2023-09-29 PROCEDURE — 78815 PET IMAGE W/CT SKULL-THIGH: CPT

## 2023-09-29 PROCEDURE — 82962 GLUCOSE BLOOD TEST: CPT

## 2023-10-02 NOTE — PROGRESS NOTES
Spoke to pt. Made aware of results & recommendations. Pt voiced understanding. Provider information given to pt. Franca Melvin M.D.   Hematology & Oncology  [facility].  Phone: [phone]

## 2023-10-04 ENCOUNTER — OFFICE VISIT (OUTPATIENT)
Facility: CLINIC | Age: 58
End: 2023-10-04
Payer: COMMERCIAL

## 2023-10-04 ENCOUNTER — OFFICE VISIT (OUTPATIENT)
Dept: HEMATOLOGY/ONCOLOGY | Facility: HOSPITAL | Age: 58
End: 2023-10-04
Attending: INTERNAL MEDICINE
Payer: COMMERCIAL

## 2023-10-04 VITALS
RESPIRATION RATE: 18 BRPM | WEIGHT: 223.19 LBS | HEART RATE: 86 BPM | OXYGEN SATURATION: 95 % | BODY MASS INDEX: 32 KG/M2 | SYSTOLIC BLOOD PRESSURE: 141 MMHG | DIASTOLIC BLOOD PRESSURE: 88 MMHG | TEMPERATURE: 97 F

## 2023-10-04 VITALS
HEIGHT: 70 IN | DIASTOLIC BLOOD PRESSURE: 80 MMHG | WEIGHT: 223 LBS | RESPIRATION RATE: 16 BRPM | SYSTOLIC BLOOD PRESSURE: 120 MMHG | OXYGEN SATURATION: 94 % | HEART RATE: 78 BPM | BODY MASS INDEX: 31.92 KG/M2

## 2023-10-04 DIAGNOSIS — Z87.891 FORMER SMOKER: ICD-10-CM

## 2023-10-04 DIAGNOSIS — R59.0 THORACIC LYMPHADENOPATHY: ICD-10-CM

## 2023-10-04 DIAGNOSIS — R91.8 MULTIPLE PULMONARY NODULES: Primary | ICD-10-CM

## 2023-10-04 DIAGNOSIS — R59.9 ADENOPATHY: ICD-10-CM

## 2023-10-04 DIAGNOSIS — R91.8 MULTIPLE LUNG NODULES ON CT: ICD-10-CM

## 2023-10-04 DIAGNOSIS — R94.2 ABNORMAL PET SCAN OF LUNG: Primary | ICD-10-CM

## 2023-10-04 PROCEDURE — 3074F SYST BP LT 130 MM HG: CPT | Performed by: INTERNAL MEDICINE

## 2023-10-04 PROCEDURE — 3008F BODY MASS INDEX DOCD: CPT | Performed by: INTERNAL MEDICINE

## 2023-10-04 PROCEDURE — 3079F DIAST BP 80-89 MM HG: CPT | Performed by: INTERNAL MEDICINE

## 2023-10-04 PROCEDURE — 99204 OFFICE O/P NEW MOD 45 MIN: CPT | Performed by: INTERNAL MEDICINE

## 2023-10-04 PROCEDURE — 99205 OFFICE O/P NEW HI 60 MIN: CPT | Performed by: INTERNAL MEDICINE

## 2023-10-04 NOTE — PATIENT INSTRUCTIONS
I will have you scheduled for a bronchoscopy as soon as possible.  Call my office if you don't hear anything by 10/6/2023

## 2023-10-04 NOTE — PATIENT INSTRUCTIONS
For triage nurse: [phone] Monday through Friday 7:30-5:00.  *Please note this is a new phone number*    After hours or weekends for emergent needs:  [phone].      To schedule diagnostic testing: Central [phone]    For Medical Records: [phone]

## 2023-10-06 ENCOUNTER — TELEPHONE (OUTPATIENT)
Facility: CLINIC | Age: 58
End: 2023-10-06

## 2023-10-06 NOTE — TELEPHONE ENCOUNTER
Pt is scheduled for the following procedure:  Name: Lara Hauser  : [birth year]  Procedure: robotic assisted bronchoscopy, radial probe EBUS, fluoro, linear EBUS, cryoprobe, cyto  Sedation:  General  Procedure codes/ CPT:  F7607306, [id number], 56652, 78967, 53175, 32737, 00376  Diagnosis: lung nodules, thoracic lymphadenopathy  ICD 10 codes: R91.1, R59.9  Clearance needed: n/a  Diabetic meds to hold: n/a  Antiplatelet/ anticoagulation meds to hold: n/a  Antibiotics: n/a  How do we obtain authorization?

## 2023-10-07 ENCOUNTER — EKG ENCOUNTER (OUTPATIENT)
Dept: LAB | Age: 58
End: 2023-10-07
Attending: INTERNAL MEDICINE
Payer: COMMERCIAL

## 2023-10-07 DIAGNOSIS — Z01.818 PRE-OP TESTING: ICD-10-CM

## 2023-10-07 LAB
ATRIAL RATE: 62 BPM
P AXIS: 23 DEGREES
P-R INTERVAL: 234 MS
Q-T INTERVAL: 412 MS
QRS DURATION: 84 MS
QTC CALCULATION (BEZET): 418 MS
R AXIS: -24 DEGREES
T AXIS: 55 DEGREES
VENTRICULAR RATE: 62 BPM

## 2023-10-07 PROCEDURE — 93005 ELECTROCARDIOGRAM TRACING: CPT

## 2023-10-07 PROCEDURE — 93010 ELECTROCARDIOGRAM REPORT: CPT | Performed by: INTERNAL MEDICINE

## 2023-10-25 ENCOUNTER — TELEPHONE (OUTPATIENT)
Dept: INTERNAL MEDICINE CLINIC | Facility: CLINIC | Age: 58
End: 2023-10-25

## 2023-10-25 ENCOUNTER — TELEPHONE (OUTPATIENT)
Facility: CLINIC | Age: 58
End: 2023-10-25

## 2023-10-25 DIAGNOSIS — R91.1 LUNG NODULE: Primary | ICD-10-CM

## 2023-10-25 DIAGNOSIS — R59.0 THORACIC LYMPHADENOPATHY: ICD-10-CM

## 2023-10-25 DIAGNOSIS — R91.8 MULTIPLE PULMONARY NODULES: Primary | ICD-10-CM

## 2023-10-25 NOTE — TELEPHONE ENCOUNTER
Agusto Doctor,    Your patient is coming to see the pulmonologist to review his bronchoscopy results and needs an updated referral please to Dr. Priscilla Granado. Thank you!

## 2023-10-25 NOTE — TELEPHONE ENCOUNTER
patient is coming to see the pulmonologist to review his bronchoscopy results and needs an updated referral please to Dr. Walter Arreguin.    Appt date: 11.3.23

## 2023-10-26 ENCOUNTER — ANESTHESIA EVENT (OUTPATIENT)
Dept: ENDOSCOPY | Facility: HOSPITAL | Age: 58
End: 2023-10-26
Payer: COMMERCIAL

## 2023-10-27 ENCOUNTER — HOSPITAL ENCOUNTER (OUTPATIENT)
Facility: HOSPITAL | Age: 58
Setting detail: HOSPITAL OUTPATIENT SURGERY
Discharge: HOME | End: 2023-10-27
Attending: INTERNAL MEDICINE | Admitting: INTERNAL MEDICINE
Payer: COMMERCIAL

## 2023-10-27 ENCOUNTER — APPOINTMENT (OUTPATIENT)
Dept: GENERAL RADIOLOGY | Facility: HOSPITAL | Age: 58
End: 2023-10-27
Attending: INTERNAL MEDICINE
Payer: COMMERCIAL

## 2023-10-27 ENCOUNTER — HOSPITAL ENCOUNTER (OUTPATIENT)
Dept: CT IMAGING | Facility: HOSPITAL | Age: 58
Discharge: HOME | End: 2023-10-27
Attending: INTERNAL MEDICINE
Payer: COMMERCIAL

## 2023-10-27 ENCOUNTER — ANESTHESIA (OUTPATIENT)
Dept: ENDOSCOPY | Facility: HOSPITAL | Age: 58
End: 2023-10-27
Payer: COMMERCIAL

## 2023-10-27 VITALS
HEIGHT: 70 IN | OXYGEN SATURATION: 94 % | TEMPERATURE: 98 F | SYSTOLIC BLOOD PRESSURE: 126 MMHG | RESPIRATION RATE: 18 BRPM | DIASTOLIC BLOOD PRESSURE: 74 MMHG | WEIGHT: 222 LBS | HEART RATE: 87 BPM | BODY MASS INDEX: 31.78 KG/M2

## 2023-10-27 DIAGNOSIS — R59.0 THORACIC LYMPHADENOPATHY: ICD-10-CM

## 2023-10-27 DIAGNOSIS — R91.1 LUNG NODULE: ICD-10-CM

## 2023-10-27 DIAGNOSIS — Z01.818 PRE-OP TESTING: Primary | ICD-10-CM

## 2023-10-27 DIAGNOSIS — R91.8 LUNG NODULES: ICD-10-CM

## 2023-10-27 LAB
BASOPHILS NFR BRONCH: 0 %
EOSINOPHIL NFR BRONCH: 2 %
LYMPHOCYTES NFR BRONCH: 24 %
MONOS+MACROS NFR BRONCH: 44 %
NEUTROPHILS NFR BRONCH: 30 %
TOTAL CELLS COUNTED FLD: 100

## 2023-10-27 PROCEDURE — 31624 DX BRONCHOSCOPE/LAVAGE: CPT | Performed by: INTERNAL MEDICINE

## 2023-10-27 PROCEDURE — 31653 BRONCH EBUS SAMPLNG 3/> NODE: CPT | Performed by: INTERNAL MEDICINE

## 2023-10-27 PROCEDURE — 76497 UNLISTED CT PROCEDURE: CPT | Performed by: INTERNAL MEDICINE

## 2023-10-27 PROCEDURE — 8E0W8CZ ROBOTIC ASSISTED PROCEDURE OF TRUNK REGION, VIA NATURAL OR ARTIFICIAL OPENING ENDOSCOPIC: ICD-10-PCS | Performed by: INTERNAL MEDICINE

## 2023-10-27 PROCEDURE — 07D78ZX EXTRACTION OF THORAX LYMPHATIC, VIA NATURAL OR ARTIFICIAL OPENING ENDOSCOPIC, DIAGNOSTIC: ICD-10-PCS | Performed by: INTERNAL MEDICINE

## 2023-10-27 RX ORDER — PROCHLORPERAZINE EDISYLATE 5 MG/ML
5 INJECTION INTRAMUSCULAR; INTRAVENOUS EVERY 8 HOURS PRN
Status: DISCONTINUED | OUTPATIENT
Start: 2023-10-27 | End: 2023-10-27 | Stop reason: HOSPADM

## 2023-10-27 RX ORDER — NALOXONE HYDROCHLORIDE 0.4 MG/ML
80 INJECTION, SOLUTION INTRAMUSCULAR; INTRAVENOUS; SUBCUTANEOUS AS NEEDED
Status: DISCONTINUED | OUTPATIENT
Start: 2023-10-27 | End: 2023-10-27 | Stop reason: HOSPADM

## 2023-10-27 RX ORDER — LIDOCAINE HYDROCHLORIDE 10 MG/ML
INJECTION, SOLUTION EPIDURAL; INFILTRATION; INTRACAUDAL; PERINEURAL AS NEEDED
Status: DISCONTINUED | OUTPATIENT
Start: 2023-10-27 | End: 2023-10-27 | Stop reason: SURG

## 2023-10-27 RX ORDER — SODIUM CHLORIDE, SODIUM LACTATE, POTASSIUM CHLORIDE, CALCIUM CHLORIDE 600; 310; 30; 20 MG/100ML; MG/100ML; MG/100ML; MG/100ML
INJECTION, SOLUTION INTRAVENOUS CONTINUOUS
Status: DISCONTINUED | OUTPATIENT
Start: 2023-10-27 | End: 2023-10-27 | Stop reason: HOSPADM

## 2023-10-27 RX ORDER — HYDROCODONE BITARTRATE AND ACETAMINOPHEN 5; 325 MG/1; MG/1
2 TABLET ORAL ONCE AS NEEDED
Status: DISCONTINUED | OUTPATIENT
Start: 2023-10-27 | End: 2023-10-27 | Stop reason: HOSPADM

## 2023-10-27 RX ORDER — ALBUTEROL SULFATE 2.5 MG/3ML
SOLUTION RESPIRATORY (INHALATION)
Status: COMPLETED
Start: 2023-10-27 | End: 2023-10-27

## 2023-10-27 RX ORDER — HYDROMORPHONE HYDROCHLORIDE 1 MG/ML
0.2 INJECTION, SOLUTION INTRAMUSCULAR; INTRAVENOUS; SUBCUTANEOUS EVERY 5 MIN PRN
Status: DISCONTINUED | OUTPATIENT
Start: 2023-10-27 | End: 2023-10-27 | Stop reason: HOSPADM

## 2023-10-27 RX ORDER — ONDANSETRON 2 MG/ML
INJECTION INTRAMUSCULAR; INTRAVENOUS AS NEEDED
Status: DISCONTINUED | OUTPATIENT
Start: 2023-10-27 | End: 2023-10-27 | Stop reason: SURG

## 2023-10-27 RX ORDER — ALBUTEROL SULFATE 2.5 MG/3ML
2.5 SOLUTION RESPIRATORY (INHALATION) AS NEEDED
Status: DISCONTINUED | OUTPATIENT
Start: 2023-10-27 | End: 2023-10-27 | Stop reason: HOSPADM

## 2023-10-27 RX ORDER — SODIUM CHLORIDE, SODIUM LACTATE, POTASSIUM CHLORIDE, CALCIUM CHLORIDE 600; 310; 30; 20 MG/100ML; MG/100ML; MG/100ML; MG/100ML
INJECTION, SOLUTION INTRAVENOUS CONTINUOUS
Status: DISCONTINUED | OUTPATIENT
Start: 2023-10-27 | End: 2023-10-27

## 2023-10-27 RX ORDER — ROCURONIUM BROMIDE 10 MG/ML
INJECTION, SOLUTION INTRAVENOUS AS NEEDED
Status: DISCONTINUED | OUTPATIENT
Start: 2023-10-27 | End: 2023-10-27 | Stop reason: SURG

## 2023-10-27 RX ORDER — HYDROMORPHONE HYDROCHLORIDE 1 MG/ML
0.4 INJECTION, SOLUTION INTRAMUSCULAR; INTRAVENOUS; SUBCUTANEOUS EVERY 5 MIN PRN
Status: DISCONTINUED | OUTPATIENT
Start: 2023-10-27 | End: 2023-10-27 | Stop reason: HOSPADM

## 2023-10-27 RX ORDER — ONDANSETRON 2 MG/ML
4 INJECTION INTRAMUSCULAR; INTRAVENOUS EVERY 6 HOURS PRN
Status: DISCONTINUED | OUTPATIENT
Start: 2023-10-27 | End: 2023-10-27 | Stop reason: HOSPADM

## 2023-10-27 RX ORDER — PHENYLEPHRINE HCL 10 MG/ML
VIAL (ML) INJECTION AS NEEDED
Status: DISCONTINUED | OUTPATIENT
Start: 2023-10-27 | End: 2023-10-27 | Stop reason: SURG

## 2023-10-27 RX ORDER — HYDROCODONE BITARTRATE AND ACETAMINOPHEN 5; 325 MG/1; MG/1
1 TABLET ORAL ONCE AS NEEDED
Status: DISCONTINUED | OUTPATIENT
Start: 2023-10-27 | End: 2023-10-27 | Stop reason: HOSPADM

## 2023-10-27 RX ORDER — MIDAZOLAM HYDROCHLORIDE 1 MG/ML
1 INJECTION INTRAMUSCULAR; INTRAVENOUS EVERY 5 MIN PRN
Status: DISCONTINUED | OUTPATIENT
Start: 2023-10-27 | End: 2023-10-27 | Stop reason: HOSPADM

## 2023-10-27 RX ORDER — HYDROMORPHONE HYDROCHLORIDE 1 MG/ML
0.6 INJECTION, SOLUTION INTRAMUSCULAR; INTRAVENOUS; SUBCUTANEOUS EVERY 5 MIN PRN
Status: DISCONTINUED | OUTPATIENT
Start: 2023-10-27 | End: 2023-10-27 | Stop reason: HOSPADM

## 2023-10-27 RX ORDER — METOPROLOL TARTRATE 5 MG/5ML
2.5 INJECTION INTRAVENOUS ONCE
Status: DISCONTINUED | OUTPATIENT
Start: 2023-10-27 | End: 2023-10-27 | Stop reason: HOSPADM

## 2023-10-27 RX ORDER — DEXAMETHASONE SODIUM PHOSPHATE 4 MG/ML
VIAL (ML) INJECTION AS NEEDED
Status: DISCONTINUED | OUTPATIENT
Start: 2023-10-27 | End: 2023-10-27 | Stop reason: SURG

## 2023-10-27 RX ORDER — ACETAMINOPHEN 500 MG
1000 TABLET ORAL ONCE AS NEEDED
Status: DISCONTINUED | OUTPATIENT
Start: 2023-10-27 | End: 2023-10-27 | Stop reason: HOSPADM

## 2023-10-27 RX ORDER — MIDAZOLAM HYDROCHLORIDE 1 MG/ML
INJECTION INTRAMUSCULAR; INTRAVENOUS AS NEEDED
Status: DISCONTINUED | OUTPATIENT
Start: 2023-10-27 | End: 2023-10-27 | Stop reason: SURG

## 2023-10-27 RX ADMIN — PHENYLEPHRINE HCL 300 MCG: 10 MG/ML VIAL (ML) INJECTION at 15:18:00

## 2023-10-27 RX ADMIN — ONDANSETRON 4 MG: 2 INJECTION INTRAMUSCULAR; INTRAVENOUS at 14:59:00

## 2023-10-27 RX ADMIN — DEXAMETHASONE SODIUM PHOSPHATE 8 MG: 4 MG/ML VIAL (ML) INJECTION at 14:59:00

## 2023-10-27 RX ADMIN — LIDOCAINE HYDROCHLORIDE 50 MG: 10 INJECTION, SOLUTION EPIDURAL; INFILTRATION; INTRACAUDAL; PERINEURAL at 14:51:00

## 2023-10-27 RX ADMIN — ROCURONIUM BROMIDE 50 MG: 10 INJECTION, SOLUTION INTRAVENOUS at 14:51:00

## 2023-10-27 RX ADMIN — ROCURONIUM BROMIDE 20 MG: 10 INJECTION, SOLUTION INTRAVENOUS at 15:12:00

## 2023-10-27 RX ADMIN — MIDAZOLAM HYDROCHLORIDE 2 MG: 1 INJECTION INTRAMUSCULAR; INTRAVENOUS at 14:47:00

## 2023-10-27 RX ADMIN — PHENYLEPHRINE HCL 200 MCG: 10 MG/ML VIAL (ML) INJECTION at 15:14:00

## 2023-10-27 NOTE — ANESTHESIA PROCEDURE NOTES
Airway  Date/Time: 10/27/2023 2:53 PM  Urgency: elective      General Information and Staff    Patient location during procedure: OR  Anesthesiologist: Mónica Hernandez MD  Performed: anesthesiologist   Performed by: Mónica Hernandez MD  Authorized by: Mónica Hernandez MD      Indications and Patient Condition  Indications for airway management: anesthesia  Spontaneous Ventilation: absent  Sedation level: deep  Preoxygenated: yes  Patient position: sniffing  Mask difficulty assessment: 2 - vent by mask + OA or adjuvant +/- NMBA    Final Airway Details  Final airway type: endotracheal airway      Successful airway: ETT  Cuffed: yes   Successful intubation technique: direct laryngoscopy  Endotracheal tube insertion site: oral  Blade: Jaylyn  Blade size: #4  ETT size (mm): 8.5    Cormack-Lehane Classification: grade IIA - partial view of glottis  Placement verified by: capnometry   Measured from: lips  ETT to lips (cm): 22  Number of attempts at approach: 1

## 2023-10-27 NOTE — OPERATIVE REPORT
[address] Patient Status:  Hospital Outpatient Surgery    3/30/1965 MRN [id number]   Location [id number] [place] [address] Attending Kayden Ferguson MD   Hosp Day # 0 PCP Phillip Lim MD     OPERATIVE REPORT:     DATE OF OPERATION: 10/27/23    PREOPERATIVE DIAGNOSIS(ES): lung nodule, thoracic lymphadenopathy     POSTOPERATIVE DIAGNOSIS(ES): same     OPERATION(S) PERFORMED:   Bronchoscopy with endobronchial ultrasound- guided transbronchial needle aspiration of subcarinal, left paratracheal and left interlobar lymphadenopathy     SURGEON: Asael Yi MD    ANESTHESIA: General. Please see separate flow sheet. EQUIPMENT:   Olympus 7.5 MHz endobronchial ultrasound bronchoscope with dedicated 22-gauge ViziShot needle. Olympus 20 MHz radial probe, endobronchial ultrasound  Olympus adult therapeutic video bronchoscope  Robotic Ion navigation software with vision probe  Standard C-arm fluoroscope. INDICATION: The patient is a 62year old male who was found to have an FDG avid left lower lobe lung nodule and thoracic lymphadenopathy of unclear etiology. The procedure is being undertaken for diagnostic purposes and mediastinal staging. Differential diagnosis, risks, benefits and alternatives were discussed at length. Alternatives such as mediastinoscopy and conventional transbronchial needle biopsy were discussed. Endobronchial ultrasound guided transbronchial needle aspiration is  superior to blind transbronchial needle aspiration and is the recommended approach for this indication. CONSENT:  Risks and benefits were reviewed with the patient in detail regarding the procedure as well as anesthesia prior to the procedure. All questions were answered, and the patient was agreeable. PROCEDURE:    Time out done prior to the start procedure. The patient's CT scan and 3-dimensional DICOM format was loaded onto the Tirendo.  Target point T1 in the left lower lobe was marked and measured at 10x11 mm. Virtual pathways were created to the lesion. This information was stored to a jump drive and loaded onto the Intuitive Ion controller software in the bronchoscopy suite. Robotic navigation, airway evaluation and biopsies  The patient was placed in a supine position, anesthesia administered, ETT was placed. The flexible bronchoscope was inserted and airway inspection was performed down to the subsegmental levels. No endobronchial lesions were seen. The scope was then withdrawn. The robotic Ion catheter was introduced via the swivel adaptor in the ETT tube. Registration was performed and confirmed with acceptable divergence. Using shape sensing robotic catheter guidance, the left lower lobe lesion was attempted to be located, however due the acute angle of the left lower lobe superior segmental bronchus, the robotic catheter was unable to advance into the targeted airway. There were no alternative airways or pathways to localize the lesion. Endobronchial ultrasound  The ultrasound videobronchoscope was used and inserted via swivel adaptor attached to the endotracheal tube. The bronchoscope was then advanced into the trachea. Ultrasound examination revealed a 8 mm lymph node in the 11L left interlobar location, a 10 mm lymph node in the 7 subcarinal location, a two adjacent 8-10mm lymph nodes in the 4L left paratracheal location, a 3 mm lymph node in the 4R right paratracheal location, and a 5 mm lymph node in the 11R right interlobar location. 6 passes were taken at the 7 subcarinal location, with 1 passes given to the cytotechnician/cytopathologist for screening, and the others placed into saline for processing. 7 passes were taken at the 4L left paratracheal location, with 2 passes given to the cytotechnician/cytopathologist for screening, and the others placed into saline for processing.    7 passes were taken at the 11L left interlobar location, with 1 passes given to the cytotechnician/cytopathologist for screening, 1 pass placed into saline for microbiological studies and the others placed into saline for processing. The endobronchial ultrasound bronchoscope was then removed. The standard bronchoscope was reintroduced into the airway. A bronchoalveolar lavage was then performed in the left lower lobe superior segment with the instillation of 120 ml sterile saline and return of 25ml which was sent for microbiological studies. Hemostasis was visually confirmed and the bronchoscope was removed. The patient tolerated the procedure well without immediate complications. EBL:  <5mL     IMPRESSION:   Left lower lobe lung nodule  Thoracic lymphadenopathy     PLAN:   await results of bronchoscopy for further delineation of care.       Jose Gaspar MD

## 2023-10-27 NOTE — DISCHARGE INSTRUCTIONS
Outpatient Instructions Following Bronchoscopy    1. Due to after effect of the medication given during your bronchoscopy, we would advise that for 12 hours after the procedure you:   - not return to work   - not drive a car or other vehicle   - not operate machinery (including kitchen equipment)   - not drink alcohol    2. Prior to your examination, a local anesthetic was used to numb the back of your throat. Do not eat for two hours. You may resume your diet at 6:10pm. Sip fluids initially and advance to your regular diet as tolerated. 3. Tenderness or swelling occasionally occur at the site where medication was injected. Should this occur, it is helpful to apply heat to the area, and notify your doctor. 4. Contact your doctor if you experience the following:   - difficulty breathing   - fever greater than 101   - large amount of blood in sputum (small amount of blood in sputum expected after biopsy)    5. If a biopsy or cytology was performed, you will be notified should further investigation be recommended. Your referring physician will receive a full report of your examination and will contact you.

## 2023-10-27 NOTE — INTERVAL H&P NOTE
Pre-op Diagnosis: Lung nodules [R91.8]  Thoracic lymphadenopathy [R59.0]    The above referenced H&P was reviewed by Anette Kline MD on 10/27/2023, the patient was examined and no significant changes have occurred in the patient's condition since the H&P was performed. I discussed with the patient and/or legal representative the potential benefits, risks and side effects of this procedure; the likelihood of the patient achieving goals; and potential problems that might occur during recuperation. I discussed reasonable alternatives to the procedure, including risks, benefits and side effects related to the alternatives and risks related to not receiving this procedure. We will proceed with procedure as planned.

## 2023-10-27 NOTE — DISCHARGE SUMMARY
Outpatient Surgery Brief Discharge Summary         Patient ID:  Yamila Pagan  [id number]  62year old  3/30/1965    Discharge Diagnoses: lung nodule, thoracic lymphadenopathy    Procedures: bronchoscopy    Discharged Condition: stable    Disposition: home    Patient Instructions: Follow-up with Laure Orosco MD in 1-2 weeks.     Diet: regular diet  Activity: as tolerated    Laure Orosco MD  10/27/2023  4:15 PM

## 2023-10-30 ENCOUNTER — TELEPHONE (OUTPATIENT)
Facility: CLINIC | Age: 58
End: 2023-10-30

## 2023-10-30 NOTE — TELEPHONE ENCOUNTER
Bronch follow up call place to pt. Pt had no problems after bronchoscopy. He was reminded of appt on Friday with Dr. Buffy Valencia.

## 2023-11-01 LAB — NON GYNE INTERPRETATION: NEGATIVE

## 2023-11-03 ENCOUNTER — OFFICE VISIT (OUTPATIENT)
Facility: CLINIC | Age: 58
End: 2023-11-03
Payer: COMMERCIAL

## 2023-11-03 VITALS
RESPIRATION RATE: 16 BRPM | HEART RATE: 79 BPM | WEIGHT: 220.5 LBS | HEIGHT: 70 IN | OXYGEN SATURATION: 94 % | SYSTOLIC BLOOD PRESSURE: 130 MMHG | DIASTOLIC BLOOD PRESSURE: 70 MMHG | BODY MASS INDEX: 31.57 KG/M2

## 2023-11-03 DIAGNOSIS — R59.0 THORACIC LYMPHADENOPATHY: ICD-10-CM

## 2023-11-03 DIAGNOSIS — R91.8 MULTIPLE PULMONARY NODULES: Primary | ICD-10-CM

## 2023-11-03 PROCEDURE — 99214 OFFICE O/P EST MOD 30 MIN: CPT | Performed by: INTERNAL MEDICINE

## 2023-11-03 PROCEDURE — 3078F DIAST BP <80 MM HG: CPT | Performed by: INTERNAL MEDICINE

## 2023-11-03 PROCEDURE — 3075F SYST BP GE 130 - 139MM HG: CPT | Performed by: INTERNAL MEDICINE

## 2023-11-03 PROCEDURE — 3008F BODY MASS INDEX DOCD: CPT | Performed by: INTERNAL MEDICINE

## 2023-11-03 NOTE — PATIENT INSTRUCTIONS
The lymph node biopsies do not show any signs of cancer thankfully. However, we were unable to biopsy the left lower lobe spot. Options at this time are:   1) have surgery to remove the left lower lobe spot (requires a hospital stay)   2) CT guided biopsy to sample the left lower lobe spot (complication of lung collapse of about 15%),   or 3) repeat a CT scan in December or January.   Let me know when you decide which option you'd like to do

## 2023-11-16 ENCOUNTER — TELEPHONE (OUTPATIENT)
Facility: CLINIC | Age: 58
End: 2023-11-16

## 2023-11-16 NOTE — TELEPHONE ENCOUNTER
Per Dr. Cheryl Carmen: Please contact patient and inquire if he has made a decision to proceed with biopsy, surgery or follow up CT scan? Pt called, asked the above. Pt states he's still thinking about it. He will call us back when he has made a decision, possibly in a few weeks.

## 2023-12-09 ENCOUNTER — HOSPITAL ENCOUNTER (OUTPATIENT)
Dept: CT IMAGING | Facility: HOSPITAL | Age: 58
Discharge: HOME | End: 2023-12-09
Attending: NURSE PRACTITIONER
Payer: COMMERCIAL

## 2023-12-09 DIAGNOSIS — R91.1 LUNG NODULE: ICD-10-CM

## 2023-12-09 PROCEDURE — 71250 CT THORAX DX C-: CPT | Performed by: NURSE PRACTITIONER

## 2023-12-14 ENCOUNTER — OFFICE VISIT (OUTPATIENT)
Facility: CLINIC | Age: 58
End: 2023-12-14
Payer: COMMERCIAL

## 2023-12-14 VITALS
HEIGHT: 70 IN | HEART RATE: 90 BPM | SYSTOLIC BLOOD PRESSURE: 120 MMHG | RESPIRATION RATE: 16 BRPM | DIASTOLIC BLOOD PRESSURE: 78 MMHG | BODY MASS INDEX: 32.28 KG/M2 | OXYGEN SATURATION: 98 % | WEIGHT: 225.5 LBS

## 2023-12-14 DIAGNOSIS — R91.1 PULMONARY NODULE, LEFT: Primary | ICD-10-CM

## 2023-12-14 DIAGNOSIS — R59.0 THORACIC LYMPHADENOPATHY: ICD-10-CM

## 2023-12-14 DIAGNOSIS — R91.1 LUNG NODULE: ICD-10-CM

## 2023-12-14 PROCEDURE — 3008F BODY MASS INDEX DOCD: CPT | Performed by: NURSE PRACTITIONER

## 2023-12-14 PROCEDURE — 3078F DIAST BP <80 MM HG: CPT | Performed by: NURSE PRACTITIONER

## 2023-12-14 PROCEDURE — 3074F SYST BP LT 130 MM HG: CPT | Performed by: NURSE PRACTITIONER

## 2023-12-14 PROCEDURE — 99214 OFFICE O/P EST MOD 30 MIN: CPT | Performed by: NURSE PRACTITIONER

## 2023-12-14 RX ORDER — MULTIVIT WITH MINERALS/LUTEIN
1000 TABLET ORAL DAILY
COMMUNITY

## 2023-12-14 NOTE — PATIENT INSTRUCTIONS
Plan for CT guided needle biopsy by Interventional Radiologist  Brett Winslow from ultrasound will call you to schedule the procedure  Please make a followup 1 week after procedure performed to review results   Please contact office at [phone] with any decline in respiratory status, questions or concerns

## 2023-12-23 DIAGNOSIS — I10 ESSENTIAL HYPERTENSION, BENIGN: ICD-10-CM

## 2023-12-26 RX ORDER — AMLODIPINE BESYLATE 5 MG/1
5 TABLET ORAL DAILY
Qty: 90 TABLET | Refills: 1 | Status: SHIPPED | OUTPATIENT
Start: 2023-12-26

## 2023-12-26 NOTE — TELEPHONE ENCOUNTER
Last time medication was refilled 07/02/2023  Quantity and # of refills 90 w 1  Last OV 09/16/2023  Next OV 03/02/2024    Passed protocol, Rx sent.

## 2024-01-02 ENCOUNTER — HOSPITAL ENCOUNTER (OUTPATIENT)
Dept: CT IMAGING | Facility: HOSPITAL | Age: 59
Discharge: HOME | End: 2024-01-02
Attending: NURSE PRACTITIONER
Payer: COMMERCIAL

## 2024-01-02 ENCOUNTER — NURSE ONLY (OUTPATIENT)
Dept: LAB | Facility: HOSPITAL | Age: 59
End: 2024-01-02
Attending: NURSE PRACTITIONER
Payer: COMMERCIAL

## 2024-01-02 ENCOUNTER — HOSPITAL ENCOUNTER (OUTPATIENT)
Dept: GENERAL RADIOLOGY | Facility: HOSPITAL | Age: 59
Discharge: HOME | End: 2024-01-02
Attending: RADIOLOGY
Payer: COMMERCIAL

## 2024-01-02 ENCOUNTER — TELEPHONE (OUTPATIENT)
Facility: CLINIC | Age: 59
End: 2024-01-02

## 2024-01-02 ENCOUNTER — TELEPHONE (OUTPATIENT)
Dept: PULMONOLOGY | Facility: HOSPITAL | Age: 59
End: 2024-01-02

## 2024-01-02 VITALS
DIASTOLIC BLOOD PRESSURE: 81 MMHG | OXYGEN SATURATION: 94 % | RESPIRATION RATE: 16 BRPM | BODY MASS INDEX: 32.21 KG/M2 | WEIGHT: 225 LBS | HEIGHT: 70 IN | HEART RATE: 89 BPM | SYSTOLIC BLOOD PRESSURE: 134 MMHG | TEMPERATURE: 97 F

## 2024-01-02 DIAGNOSIS — R91.1 PULMONARY NODULE: Primary | ICD-10-CM

## 2024-01-02 DIAGNOSIS — R91.1 PULMONARY NODULE: ICD-10-CM

## 2024-01-02 DIAGNOSIS — R91.1 PULMONARY NODULE, LEFT: ICD-10-CM

## 2024-01-02 LAB
INR BLD: 0.94 (ref 0.8–1.2)
PLATELET # BLD AUTO: 184 10(3)UL (ref 150–450)
PROTHROMBIN TIME: 12.6 SECONDS (ref 11.6–14.8)

## 2024-01-02 PROCEDURE — 99152 MOD SED SAME PHYS/QHP 5/>YRS: CPT | Performed by: NURSE PRACTITIONER

## 2024-01-02 PROCEDURE — 85049 AUTOMATED PLATELET COUNT: CPT | Performed by: RADIOLOGY

## 2024-01-02 PROCEDURE — 99153 MOD SED SAME PHYS/QHP EA: CPT | Performed by: NURSE PRACTITIONER

## 2024-01-02 PROCEDURE — 36415 COLL VENOUS BLD VENIPUNCTURE: CPT | Performed by: RADIOLOGY

## 2024-01-02 PROCEDURE — 71045 X-RAY EXAM CHEST 1 VIEW: CPT | Performed by: RADIOLOGY

## 2024-01-02 PROCEDURE — 32408 CORE NDL BX LNG/MED PERQ: CPT | Performed by: NURSE PRACTITIONER

## 2024-01-02 PROCEDURE — 85610 PROTHROMBIN TIME: CPT

## 2024-01-02 RX ORDER — FLUMAZENIL 0.1 MG/ML
0.2 INJECTION INTRAVENOUS AS NEEDED
Status: DISCONTINUED | OUTPATIENT
Start: 2024-01-02 | End: 2024-01-04

## 2024-01-02 RX ORDER — SODIUM CHLORIDE 9 MG/ML
INJECTION, SOLUTION INTRAVENOUS CONTINUOUS
Status: DISCONTINUED | OUTPATIENT
Start: 2024-01-02 | End: 2024-01-02

## 2024-01-02 RX ORDER — MIDAZOLAM HYDROCHLORIDE 1 MG/ML
INJECTION INTRAMUSCULAR; INTRAVENOUS
Status: COMPLETED
Start: 2024-01-02 | End: 2024-01-02

## 2024-01-02 RX ORDER — NALOXONE HYDROCHLORIDE 0.4 MG/ML
80 INJECTION, SOLUTION INTRAMUSCULAR; INTRAVENOUS; SUBCUTANEOUS AS NEEDED
Status: DISCONTINUED | OUTPATIENT
Start: 2024-01-02 | End: 2024-01-04

## 2024-01-02 RX ORDER — MIDAZOLAM HYDROCHLORIDE 1 MG/ML
1 INJECTION INTRAMUSCULAR; INTRAVENOUS EVERY 5 MIN PRN
Status: DISCONTINUED | OUTPATIENT
Start: 2024-01-02 | End: 2024-01-02

## 2024-01-02 RX ADMIN — MIDAZOLAM HYDROCHLORIDE 1 MG: 1 INJECTION INTRAMUSCULAR; INTRAVENOUS at 10:02:00

## 2024-01-02 RX ADMIN — MIDAZOLAM HYDROCHLORIDE 1 MG: 1 INJECTION INTRAMUSCULAR; INTRAVENOUS at 09:31:00

## 2024-01-02 RX ADMIN — MIDAZOLAM HYDROCHLORIDE 1 MG: 1 INJECTION INTRAMUSCULAR; INTRAVENOUS at 09:28:00

## 2024-01-02 RX ADMIN — SODIUM CHLORIDE: 9 INJECTION, SOLUTION INTRAVENOUS at 10:12:00

## 2024-01-02 RX ADMIN — MIDAZOLAM HYDROCHLORIDE 1 MG: 1 INJECTION INTRAMUSCULAR; INTRAVENOUS at 09:40:00

## 2024-01-02 RX ADMIN — SODIUM CHLORIDE: 9 INJECTION, SOLUTION INTRAVENOUS at 09:15:00

## 2024-01-02 NOTE — TELEPHONE ENCOUNTER
Received call from interventional radiology they reported that the lesion is behind the rib and they would not be able to biopsy as requested per Dr. Epperson.  Likely would need thoracic surgery evaluate the patient.    I will request Dr. Isrrael Epperson to evaluate the patient further.    Darius Urena MD

## 2024-01-02 NOTE — H&P
[facility]  History & Physical    [facility]    Mark Eason Patient Status:  Outpatient    3/30/1965 MRN [id number]   Location [facility] CT Attending Jana Lala APRN   Hosp Day # 0 PCP You Newman MD         Admitting Diagnosis:  LLL lung nodule    History of Present Illness:  Incidental discovery of a LLL lung nodule during CT calcium score and CT urogram imaging.  Follow up PET CT showed FDG avidity. Attempted EBUS unable to reach nodule. Now for CT guided biopsy.     Past Medical History:  Past Medical History:   Diagnosis Date    Back pain     Calculus of kidney     Hemorrhoids     Hepatitis B 1989    High blood pressure     High cholesterol     Laboratory exam ordered as part of routine general medical examination 2013    Other and unspecified hyperlipidemia 2005    Stress     Unspecified essential hypertension 2005           Past Surgical History:    Past Surgical History:   Procedure Laterality Date    OTHER SURGICAL HISTORY  2019    cyst scalp       Allergies:  Allergies   Allergen Reactions    Crestor [Rosuvastatin] MYALGIA    Lipitor [Atorvastatin] MYALGIA       Medications:      Current Outpatient Medications:     Ascorbic Acid (VITAMIN C) 1000 MG Oral Tab, Take 1 tablet (1,000 mg total) by mouth daily., Disp: , Rfl:     Omega-3 Fatty Acids (FISH OIL OR), Take 1 tablet by mouth daily as needed., Disp: , Rfl:     Coenzyme Q10 (COQ10 OR), Take 1 tablet by mouth daily., Disp: , Rfl:     PEG 3350-KCl-NaBcb-NaCl-NaSulf (PEG-3350/ELECTROLYTES) 236 g Oral Recon Soln, Take as directed by physician, Disp: 4000 mL, Rfl: 0    pravastatin 40 MG Oral Tab, Take 1 tablet (40 mg total) by mouth nightly. (Patient taking differently: Take 0.5 tablets (20 mg total) by mouth nightly.), Disp: 90 tablet, Rfl: 1    METOPROLOL TARTRATE 50 MG Oral Tab, TAKE 1 TABLET (50 MG TOTAL) BY MOUTH IN THE MORNING AND 1 TABLET (50 MG TOTAL) BEFORE BEDTIME., Disp: 180 tablet, Rfl: 1     aspirin 81 MG Oral Tab EC, Take 1 tablet (81 mg total) by mouth daily., Disp: , Rfl:     AMLODIPINE 5 MG Oral Tab, TAKE 1 TABLET (5 MG TOTAL) BY MOUTH IN THE MORNING, Disp: 90 tablet, Rfl: 1  Social History:  Social History     Tobacco Use    Smoking status: Former     Packs/day: 0.50     Years: 20.00     Additional pack years: 0.00     Total pack years: 10.00     Types: Cigarettes     Quit date: 2004     Years since quittin.3    Smokeless tobacco: Never   Substance Use Topics    Alcohol use: Not Currently     Comment: rarely        Family History:  Family History   Problem Relation Age of Onset    Hypertension Mother     Cancer Brother         lymphoma    Heart Disorder Other          Physical Exam:    BP (!) 143/91 (BP Location: Left arm)   Pulse 81   Temp 97.9 °F (36.6 °C) (Temporal)   Resp 25   Ht 70\"   Wt 225 lb   SpO2 95%   BMI 32.28 kg/m²     General: NAD  Neck: No JVD  Lungs: CTA bilat  Heart: RRR, S1, S2  Abdomen: Soft, NT/ND, BS+  Extremities: Warm, dry, no LE edema bilat      Labs:  Recent Labs   Lab 24  0729   .0     Recent Labs   Lab 24  0729   PTP 12.6   INR 0.94     No results for input(s): \"GLU\", \"BUN\", \"CREATSERUM\", \"GFRAA\", \"GFRNAA\", \"CA\", \"NA\", \"K\", \"CL\", \"CO2\" in the last 168 hours.    Impression: LLL lung nodule      Recommendations: CT guided lung biopsy    Stan Houston MD

## 2024-01-02 NOTE — PROCEDURES
I have discussed with the patient and/or legal representative the potential benefits, risks, and side effects of this CT guided LLL lung nodule biopsy procedure, the likelihood of the patient achieving goals; and the potential problems that might occur during recuperation.  I discussed reasonable alternatives to the procedure, including risks, benefits and side effects related to the alternatives, and risks related to not receiving this procedure.

## 2024-01-02 NOTE — PROCEDURES
[facility]    Mark Eason Patient Status:  Outpatient    3/30/1965 MRN [id number]   Location [facility] CT Attending Jana Lala APRN   Hosp Day # 0 PCP You Newman MD         Brief Procedure Report    Pre-Operative Diagnosis: LLL lung nodule    Post-Operative Diagnosis: Same as above.    Procedure Performed: Attempted CT guided LLL lung biopsy    Anesthesia: 1% lidocaine.  Moderate sedation    EBL: <1cc    Complications: None    Summary of Case: Unable to position needle within nodule secondary to position of nodule behind left rib.  No biopsy obtained. Will need consultation with pulmonologist and CT surgery. Patient tolerated procedure well without immediate complication. Full report to follow in PACS.    Stan Houston

## 2024-01-02 NOTE — DISCHARGE INSTRUCTIONS
[facility] Department of Radiology    Biopsy of any type         Have someone drive you home after your procedure.  You may not drive yourself home    AFTER YOU ARRIVE HOME    Take things easy for the rest of the day after your biopsy.  You may be sore in the biopsy area for one to five days  DO drink plenty of fluids  DO resume your regular diet  DO keep a bandage on the biopsy site for at least 24 hours  DO NOT take a hot bath or shower for at least 12 hours  DO NOT drive or operative machinery for at least 24 hours  DO NOT smoke for at least 24 hours  DO NOT do any strenuous exercise or lifting for at least 48 hours  DO call your doctor immediately if  You start bleeding  There is any change in color or temperature of the area where the biopsy was performed  You develop increasing pain in shortness of breath    Hold Aspirin and Aleve for 48 hours per Dr. Houston

## 2024-01-02 NOTE — TELEPHONE ENCOUNTER
Pt called. Has questions about today's procedure. Follow up post CT biopsy today. Pt would like a phone call, doesn't have access to [employer] for [facility].   
Pt called. LVM.       I called and left a voicemail. I was calling you to inform you, Jana Lala, our nurse practitioner is asking for you to set up an appointment to be seen to discuss today's CT biopsy procedure and plan of care. Please call our office and let our front office staff know that you are calling to schedule an appointment to be seen by Jana Lala as soon as possible. If you are not able to come in to the office, we can also schedule a telehealth visit, which can be done via phone.       The message above was sent to pt via [facility]. Awaiting response and/or call back.   
Pt need talk to nurse regarding his biopcy,,?  
Thanks Arturo. Lori called him too. Looks like he will call back to schedule
Fransico Velasquez

## 2024-01-02 NOTE — IMAGING NOTE
Received pt to radiology holding.  Pt verified name and  as well as allergies.  Pt states NPO since  last night.  Pt states he has held his aspirin with last dose .  Lab results of PT/INR and PLT reviewed.  Informed consent obtained by Dr. Houston.  Pt positioned prone on scanner.  CRM and pulse ox monitoring applied, alarms enabled. Sterile prep and 1% lido to area by Dr. Houston.  Specimen unable to be obtained per MD.  Neosporin/tegaderm dressing applied to site. Nickel size drop of blood noted to dressing.  Pt positioned supine on rney.  Pt awake and conversing appropriately with this RN. and in no apparent distress.  [facility] called to jenny Trevino RN.  Pt transported to room 2257 with belongings. Pt accompanied by this RN and transporter.  His wife, Mari [phone] will pick him up.

## 2024-01-02 NOTE — TELEPHONE ENCOUNTER
Thank you Dr Urena. Yes he has been followed by DR Epperson and I and doesnot want surgery. I will have scheduling call him to schedule a followup. Crystal

## 2024-01-03 ENCOUNTER — VIRTUAL PHONE E/M (OUTPATIENT)
Facility: CLINIC | Age: 59
End: 2024-01-03
Payer: COMMERCIAL

## 2024-01-03 DIAGNOSIS — R91.1 LUNG NODULE: Primary | ICD-10-CM

## 2024-01-03 PROCEDURE — 99442 PHONE E/M BY PHYS 11-20 MIN: CPT | Performed by: NURSE PRACTITIONER

## 2024-01-03 NOTE — PROGRESS NOTES
[facility] General Pulmonary Progress Note    History of Present Illness:  Mark Eason is a 58 year old male with significant PMH of lung nodules who presents today for follow up s/p attempted CT guided needle biopsy. Pt and wife on the phone. They plan to go downtown to the [facility] for a second opinion. Unable to connect to video.    Previously 12/14/23  a 58 year old male who presents for Ct review and follow - up. Feels well. No new complaints. Eating healthier. Back to driving a truck.      Previously 11/2023 Dr Epperson  a 58 year old male former smoker (quit 2004, 40 pack years) with significant PMH of HTN who presents today for follow up post bronchoscopy of lung nodule and thoracic lymphadenopathy. He denies new concerns today post procedure. No cough or dyspnea. No pain     October 2023 previously  The patient underwent first a CT uorgram then CT calcium scoring CT and each demonstrated a LLL nodule. He had a follow up CT chest confirming the finding and then PET/CT showing FDG avidity in the LLL nodule as well as several mediastinal and left hilar lymph nodes. His case was reviewed in [facility] with recommendation to proceed with bronchoscopy and biopsy of the LLL nodule and EBUS/TBNA biopsies of the lymphadenopathy.   He presently denies acute concerns. Feels well. No cough, dyspnea, pain, f/c/s.  He does have rhinitis with nasal congestion/drainage.    No recent illness.      Works as , around fumes, chemicals.       Past Medical History:   Past Medical History:   Diagnosis Date    Back pain     Calculus of kidney     Hemorrhoids     Hepatitis B 01/01/1989    High blood pressure     High cholesterol     Laboratory exam ordered as part of routine general medical examination 04/03/2013    Other and unspecified hyperlipidemia 01/01/2005    Stress     Unspecified essential hypertension 01/01/2005        Past Surgical History:   Past Surgical History:   Procedure Laterality Date    OTHER  SURGICAL HISTORY  2019    cyst scalp         Family Medical History:   Family History   Problem Relation Age of Onset    Hypertension Mother     Cancer Brother         lymphoma    Heart Disorder Other         Social History:   Social History     Socioeconomic History    Marital status:      Spouse name: Not on file    Number of children: Not on file    Years of education: Not on file    Highest education level: Not on file   Occupational History    Not on file   Tobacco Use    Smoking status: Former     Packs/day: 0.50     Years: 20.00     Additional pack years: 0.00     Total pack years: 10.00     Types: Cigarettes     Quit date: 2004     Years since quittin.3    Smokeless tobacco: Never   Vaping Use    Vaping Use: Never used   Substance and Sexual Activity    Alcohol use: Not Currently     Comment: rarely    Drug use: No    Sexual activity: Not on file   Other Topics Concern     Service Not Asked    Blood Transfusions Not Asked    Caffeine Concern Yes     Comment: 1 cup coffee daily.  1-2 cups of mint tea daily.    Occupational Exposure Not Asked    Hobby Hazards Not Asked    Sleep Concern Not Asked    Stress Concern Not Asked    Weight Concern Not Asked    Special Diet Not Asked    Back Care Not Asked    Exercise Yes     Comment: 3x/week.     Bike Helmet Not Asked    Seat Belt Not Asked    Self-Exams Not Asked   Social History Narrative    Not on file     Social Determinants of Health     Financial Resource Strain: Not on file   Food Insecurity: Not on file   Transportation Needs: Not on file   Physical Activity: Not on file   Stress: Not on file   Social Connections: Not on file   Housing Stability: Not on file        Medications:   Current Outpatient Medications   Medication Sig Dispense Refill    AMLODIPINE 5 MG Oral Tab TAKE 1 TABLET (5 MG TOTAL) BY MOUTH IN THE MORNING 90 tablet 1    Ascorbic Acid (VITAMIN C) 1000 MG Oral Tab Take 1 tablet (1,000 mg total) by mouth daily.      Omega-3  Fatty Acids (FISH OIL OR) Take 1 tablet by mouth daily as needed.      Coenzyme Q10 (COQ10 OR) Take 1 tablet by mouth daily.      PEG 3350-KCl-NaBcb-NaCl-NaSulf (PEG-3350/ELECTROLYTES) 236 g Oral Recon Soln Take as directed by physician 4000 mL 0    pravastatin 40 MG Oral Tab Take 1 tablet (40 mg total) by mouth nightly. (Patient taking differently: Take 0.5 tablets (20 mg total) by mouth nightly.) 90 tablet 1    METOPROLOL TARTRATE 50 MG Oral Tab TAKE 1 TABLET (50 MG TOTAL) BY MOUTH IN THE MORNING AND 1 TABLET (50 MG TOTAL) BEFORE BEDTIME. 180 tablet 1    aspirin 81 MG Oral Tab EC Take 1 tablet (81 mg total) by mouth daily.         Review of Systems: Review of Systems   Constitutional:  Negative for activity change, appetite change, chills, diaphoresis, fatigue, fever and unexpected weight change.   HENT:  Negative for congestion, postnasal drip, rhinorrhea, sinus pressure, sinus pain, sneezing, sore throat, trouble swallowing and voice change.    Respiratory:  Negative for apnea, cough, choking, chest tightness, shortness of breath, wheezing and stridor.    Cardiovascular:  Negative for chest pain, palpitations and leg swelling.   Musculoskeletal:  Negative for arthralgias.   Skin:  Negative for pallor and rash.   Allergic/Immunologic: Negative for immunocompromised state.   Neurological:  Negative for dizziness and headaches.   Hematological:  Negative for adenopathy.          Results:  Personally reviewed    WBC: 5.6, done on 8/12/2023.  HGB: 13.9, done on 8/12/2023.  PLT: 184, done on 1/2/2024.     Glucose: 108, done on 8/12/2023.  Cr: 0.91, done on 8/12/2023.  GFR(AA): 103, done on 2/11/2022.  GFR (non-AA): 89, done on 2/11/2022.  CA: 9.3, done on 8/12/2023.  Na: 139, done on 8/12/2023.  K: 3.9, done on 8/12/2023.  Cl: 108, done on 8/12/2023.  CO2: 28, done on 8/12/2023.  Last ALB was 4.2% done on 8/12/2023.     XR CHEST AP PORTABLE  (CPT=71045)    Result Date: 1/2/2024  CONCLUSION:  Persistent blunting of  left costophrenic angle may represent a small left effusion versus stable scarring.   LOCATION:  [facility]      Dictated by (CST): Apolinar Yap MD on 1/02/2024 at 1:45 PM     Finalized by (CST): Apolinar Yap MD on 1/02/2024 at 1:47 PM       CT BIOPSY LUNG OR MEDIASTINUM PERC W/IMG(CPT=32408)    Result Date: 1/2/2024  CONCLUSION:  Unsuccessful attempts at performing CT-guided percutaneous left lower lobe lung nodule biopsy.  The lung nodule is located in a position anterior to posterior left rib and costovertebral junction such that successful needle placement could not be obtained despite multiple attempts.  Dr. Epperson was paged.  Recommend thoracic surgery consultation.   LOCATION:  [facility]   Dictated by (CST): Stan Houston MD on 1/02/2024 at 11:58 AM     Finalized by (CST): Stan Houston MD on 1/02/2024 at 12:08 PM       XR CHEST AP PORTABLE  (CPT=71045)    Result Date: 1/2/2024  CONCLUSION:   Stable cardiac and mediastinal contours.  Subsegmental left basilar atelectasis or scar, similar compared to 04/29/2021.  No acute airspace disease.  No significant pleural effusion or appreciable pneumothorax.   LOCATION:  [facility]      Dictated by (CST): Toni Martin MD on 1/02/2024 at 11:25 AM     Finalized by (CST): Toni Martin MD on 1/02/2024 at 11:26 AM       CT CHEST (WJA=27705)    Result Date: 12/9/2023  CONCLUSION:  Stable lobulated nodule within the superior segment left lower lobe measuring 12 x 11 mm with the differential diagnosis including a primary neoplasm versus sequela of prior infection such as granuloma.  LOCATION:  [facility]   Dictated by (CST): Matias Grider MD on 12/09/2023 at 1:14 PM     Finalized by (CST): Matias Grider MD on 12/09/2023 at 1:21 PM       CT CHEST BRONCH NAVIGATION PRE OP LESS THAN 6 WEEKS (CPT=76497)    Result Date: 10/27/2023  CONCLUSION:   Continued stability of lobular soft tissue density nodule of the superior segment left lower lobe with reticular nodular changes along its  periphery, dimensions as above.   LOCATION:  [id number]   Dictated by (CST): Toni Martin MD on 10/27/2023 at 2:29 PM     Finalized by (CST): Toni Martin MD on 10/27/2023 at 2:34 PM         Assessment/Plan:  #1. Lung nodule  Found incidentally on CT urogram and calcium scoring CT  9/2023 calcium scoring CT with LLL nodule  9/2023 CT chest with LLL superior segment nodule measuring 13mm with cluster of neighboring smaller nodules. +hilar, subcarinal and paratracheal nodes  9/2023 PET/CT with FDG avidity of the LLL nodule (SUV 5.8) and LAD (AP window SUV 4.3, left paratracheal SUV 3.8, left hilar node SUV 7.8)  10/2023 robotic assisted bronch was UNABLE to navigate to the LLL nodule.  EBUS/TBNA biopsies of 7, 4L and 11L were NEGATIVE.   Reviewed imaging findings and differential with the patient and wife and his case was reviewed in [facility] this week with recommendation to consider surgical resection vs CT guided biopsy.  We also discussed he could further consider repeating a CT in 2-3months to reassess as the patient and wife admit he was ill right before the previous CT imaging raising cocnern for infection.  They will think about their options and let me know  12/2023 Ct chest personally reviewed with Dr Epperson. See report above. As nodule has not decreased in size recommendations remain for surgical consultation for removal of nodule or CT guided bx.   Discussed both options at length with pt in room and wife on phone. Decision made to have CT guided needle bx and return to review results.   1/3/24 s/p attempted CT guided needle bx. IR physician unable to obtain biopsy. Discussed with pt and wife and they have family members who work at [facility] and planning to go for second opinion.  Recommended they ask for copies of imaging put on disc from radiology, reports and contact our office if we can be of any assistance or plan to return to [facility] for care     #2. Thoracic lymphadenopathy  As  above      HAO Arce  [facility] Pulmonary   1/3/2024    This visit is conducted using Telemedicine with live, interactive audio.    Patient has been referred to the [facility] website at [email or web address] to review the yearly Consent to Treat document.    Patient understands and accepts financial responsibility for any deductible, co-insurance and/or co-pays associated with this service.

## 2024-01-03 NOTE — PATIENT INSTRUCTIONS
Please contact our office at [phone] if in need of any assistance or if plan to continue care here at [facility] or with any decline in respiratory status, questions or concerns

## 2024-01-04 ENCOUNTER — TELEPHONE (OUTPATIENT)
Dept: INTERNAL MEDICINE CLINIC | Facility: CLINIC | Age: 59
End: 2024-01-04

## 2024-01-04 NOTE — TELEPHONE ENCOUNTER
Provider [facility] not [facility]. Spouse notified. Will contact office back in morning with alternative provider referral request.

## 2024-01-04 NOTE — TELEPHONE ENCOUNTER
[facility] [employer]    Requesting referral for 2nd opinion     Pulmonologist at [facility]  Dr. Gerard Danielle   Ph: [phone]    Lung    [facility] suggesting Surgery   Patient not comfortable with a full lung surgery with out diagnosis.    Last OV 9/16/23    Next 3/2/24

## 2024-01-09 NOTE — TELEPHONE ENCOUNTER
Per Kassi (wife) [facility] provider Gerard Nguyen does accept pt's insurance as spoke with insurance    Please process referral

## 2024-01-09 NOTE — TELEPHONE ENCOUNTER
Called Ph: [phone] spoke with Kelvin, stated their practice is out of network for any HMO insurance  PSR notified to contact pt and let them know to contact pt

## 2024-02-04 DIAGNOSIS — I10 ESSENTIAL HYPERTENSION, BENIGN: ICD-10-CM

## 2024-02-05 RX ORDER — METOPROLOL TARTRATE 50 MG/1
50 TABLET, FILM COATED ORAL 2 TIMES DAILY
Qty: 180 TABLET | Refills: 1 | Status: SHIPPED | OUTPATIENT
Start: 2024-02-05

## 2024-02-05 NOTE — TELEPHONE ENCOUNTER
Last time medication was refilled 08/08/2023  Quantity and # of refills 180 w 0  Last OV 09/16/2023  Next OV   Future Appointments   Date Time Provider Department Center   3/2/2024 10:30 AM Zina Pineda APRN [facility] [address]       Passed protocol, Rx sent.

## 2024-05-05 DIAGNOSIS — I10 ESSENTIAL HYPERTENSION, BENIGN: ICD-10-CM

## 2024-05-05 RX ORDER — AMLODIPINE BESYLATE 5 MG/1
5 TABLET ORAL DAILY
Qty: 90 TABLET | Refills: 1 | Status: SHIPPED | OUTPATIENT
Start: 2024-05-05

## 2024-07-30 DIAGNOSIS — I10 ESSENTIAL HYPERTENSION, BENIGN: ICD-10-CM

## 2024-07-30 RX ORDER — METOPROLOL TARTRATE 50 MG/1
50 TABLET, FILM COATED ORAL 2 TIMES DAILY
Qty: 180 TABLET | Refills: 0 | Status: SHIPPED | OUTPATIENT
Start: 2024-07-30

## 2024-07-30 NOTE — TELEPHONE ENCOUNTER
Last time medication was refilled 02/05/2024  Last office visit  09/16/2023  Next office visit due/scheduled No future appointments.    Passed protocol, Medication sent.

## 2024-10-29 DIAGNOSIS — I10 ESSENTIAL HYPERTENSION, BENIGN: ICD-10-CM

## 2024-10-29 RX ORDER — AMLODIPINE BESYLATE 5 MG/1
5 TABLET ORAL DAILY
Qty: 90 TABLET | Refills: 1 | Status: SHIPPED | OUTPATIENT
Start: 2024-10-29

## 2024-10-29 RX ORDER — METOPROLOL TARTRATE 50 MG
50 TABLET ORAL 2 TIMES DAILY
Qty: 180 TABLET | Refills: 0 | Status: SHIPPED | OUTPATIENT
Start: 2024-10-29

## 2024-10-29 NOTE — TELEPHONE ENCOUNTER
AMLODIPINE 5 MG Oral Tab   Last time medication was refilled 05/05/2024  Last office visit  09/16/2023  Next office visit due/scheduled No future appointments.    Medication failed protocol, routed to Phyllis Cotter Nurse Practitioner.     metoprolol tartrate 50 MG Oral Tab   Last time medication was refilled 07/30/2024  Last office visit  09/16/2023  Next office visit due/scheduled No future appointments.    Medication failed protocol, routed to Phyllis Cotter Nurse Practitioner.     Patient due for annual,  to coordinate.

## 2025-01-28 DIAGNOSIS — I10 ESSENTIAL HYPERTENSION, BENIGN: ICD-10-CM

## 2025-01-28 RX ORDER — METOPROLOL TARTRATE 50 MG
50 TABLET ORAL 2 TIMES DAILY
Qty: 60 TABLET | Refills: 0 | Status: SHIPPED | OUTPATIENT
Start: 2025-01-28

## 2025-01-28 NOTE — TELEPHONE ENCOUNTER
Last time medication was refilled 10/29/24  Last office visit  9/16/23  Next office visit due/scheduled No future appointments.    Medication failed protocol      Last office visit 9/16/2023, please call to schedule physical.

## 2025-03-06 DIAGNOSIS — I10 ESSENTIAL HYPERTENSION, BENIGN: ICD-10-CM

## 2025-03-06 RX ORDER — METOPROLOL TARTRATE 50 MG
50 TABLET ORAL 2 TIMES DAILY
Qty: 60 TABLET | Refills: 0 | Status: SHIPPED | OUTPATIENT
Start: 2025-03-06

## 2025-03-06 NOTE — TELEPHONE ENCOUNTER
Last time medication was refilled 01/28/2025  Last office visit  09/16/2023  Next office visit due/scheduled No future appointments.    Medication failed protocol.    Patient overdue for annual,  to coordinate.

## 2025-04-13 DIAGNOSIS — I10 ESSENTIAL HYPERTENSION, BENIGN: ICD-10-CM

## 2025-04-13 RX ORDER — METOPROLOL TARTRATE 50 MG
50 TABLET ORAL 2 TIMES DAILY
Qty: 60 TABLET | Refills: 0 | Status: SHIPPED | OUTPATIENT
Start: 2025-04-13

## 2025-04-13 NOTE — TELEPHONE ENCOUNTER
Mychart msg sent to pt to [facility] apt for future refills. Pt aware refill is needed for future refills.   Last time medication was refilled: 3/6/25  Next office visit due/scheduled: no apt  Last office visit: 9/16/23  Last Labs: 8/12/23

## 2025-05-04 DIAGNOSIS — I10 ESSENTIAL HYPERTENSION, BENIGN: ICD-10-CM

## 2025-05-04 RX ORDER — AMLODIPINE BESYLATE 5 MG/1
5 TABLET ORAL DAILY
Qty: 30 TABLET | Refills: 0 | Status: SHIPPED | OUTPATIENT
Start: 2025-05-04

## 2025-05-04 NOTE — TELEPHONE ENCOUNTER
Mychart msg sent to pt to [facility] apt for future refills. Pt aware refill is needed for future refills.   Last time medication was refilled: 3/6/25  Next office visit due/scheduled: no apt  Last office visit: 9/16/23  Last Labs: 8/12/23  #30 only given

## 2025-05-15 DIAGNOSIS — I10 ESSENTIAL HYPERTENSION, BENIGN: ICD-10-CM

## 2025-05-15 RX ORDER — METOPROLOL TARTRATE 50 MG
50 TABLET ORAL 2 TIMES DAILY
Qty: 60 TABLET | Refills: 0 | Status: SHIPPED | OUTPATIENT
Start: 2025-05-15

## 2025-05-15 NOTE — TELEPHONE ENCOUNTER
Last time medication was refilled 04/13/2025  Last office visit  09/16/2023  Next office visit due/scheduled No future appointments.        Medication failed protocol.

## 2025-06-04 DIAGNOSIS — I10 ESSENTIAL HYPERTENSION, BENIGN: ICD-10-CM

## 2025-06-04 RX ORDER — AMLODIPINE BESYLATE 5 MG/1
5 TABLET ORAL DAILY
Qty: 15 TABLET | Refills: 0 | Status: SHIPPED | OUTPATIENT
Start: 2025-06-04

## 2025-06-04 NOTE — TELEPHONE ENCOUNTER
Last time medication was refilled 5/4/25  Last office visit  9/16/23  Next office visit due/scheduled No future appointments.  Medication failed protocol          Last OV 9/16/23, please call to schedule physical.

## 2025-06-14 DIAGNOSIS — I10 ESSENTIAL HYPERTENSION, BENIGN: ICD-10-CM

## 2025-06-14 RX ORDER — METOPROLOL TARTRATE 50 MG
50 TABLET ORAL 2 TIMES DAILY
Qty: 30 TABLET | Refills: 0 | Status: SHIPPED | OUTPATIENT
Start: 2025-06-14

## 2025-06-14 NOTE — TELEPHONE ENCOUNTER
Last time medication was refilled 5/15/25  Last office visit  9/16/23  Next office visit due/scheduled No future appointments.    Medication failed protocol        Last OV 9/16/23, please contact patient to schedule physical.

## (undated) DIAGNOSIS — I10 ESSENTIAL HYPERTENSION, BENIGN: ICD-10-CM

## (undated) DIAGNOSIS — Z00.00 LABORATORY EXAMINATION ORDERED AS PART OF A ROUTINE GENERAL MEDICAL EXAMINATION: Primary | ICD-10-CM

## (undated) DEVICE — BOWLS UTILITY 16OZ

## (undated) DEVICE — AIRLIFE™ MISTY MAX 10™ NEBULIZER WITH 7 FOOT (2.1 M) FEMALE U/CONNECT-IT CRUSH RESISTANT OXYGEN TUBING, BAFFLED TEE ADAPTER (22 MM I.D./ 22 MM O.D.), MOUTHPIECE AND 6 INCH (15 CM) FLEXTUBE: Brand: AIRLIFE™

## (undated) DEVICE — GLV SURG SZ 7.5 THK10MIL WHT ISOLEX SYN

## (undated) DEVICE — SYRINGE MED 10ML SLIP TIP CLR BRL TAPR PLUNG

## (undated) DEVICE — 4-WAY HIGH FLOW STOPCOCK W/ROTATING LUER: Brand: ICU MEDICAL

## (undated) DEVICE — 1200CC GUARDIAN II: Brand: GUARDIAN

## (undated) DEVICE — SWIVEL CONNECTOR

## (undated) DEVICE — NEEDLE ASPIR 22GA L40MM WRK L70CM SYR VLV

## (undated) DEVICE — SINGLE USE ASPIRATION NEEDLE: Brand: SINGLE USE ASPIRATION NEEDLE

## (undated) DEVICE — 60 ML SYRINGE REGULAR TIP: Brand: MONOJECT

## (undated) DEVICE — FLUIDGARD® 160 ANTI-FOG SURGICAL MASK WITH ANTI-GLARE SHIELD: Brand: PRECEPT ®

## (undated) DEVICE — AIRLIFE™ VOLUMETRIC INCENTIVE SPIROMETER, 4000 ML: Brand: AIRLIFE

## (undated) DEVICE — SYRINGE MED 20ML STD CLR PLAS LL TIP N CTRL

## (undated) DEVICE — KENDALL SCD EXPRESS SLEEVES, KNEE LENGTH, MEDIUM: Brand: KENDALL SCD

## (undated) DEVICE — MEDI-VAC SUCTION HANDLE REGULAR CAPACITY: Brand: CARDINAL HEALTH

## (undated) DEVICE — VISION PROBE ADAPTER AND SUCTION ADAPTER

## (undated) DEVICE — 10FT COMBINED O2 DELIVERY/CO2 MONITORING. FILTER WITH MICROSTREAM TYPE LUER: Brand: DUAL ADULT NASAL CANNULA

## (undated) DEVICE — 3M™ RED DOT™ MONITORING ELECTRODE WITH FOAM TAPE AND STICKY GEL, 50/BAG, 20/CASE, 72/PLT 2570: Brand: RED DOT™

## (undated) DEVICE — SINGLE USE SUCTION VALVE MAJ-209: Brand: SINGLE USE SUCTION VALVE (STERILE)

## (undated) DEVICE — STERIS KITS

## (undated) DEVICE — SINGLE USE BIOPSY VALVE MAJ-210: Brand: SINGLE USE BIOPSY VALVE (STERILE)

## (undated) DEVICE — MEDI-VAC NON-CONDUCTIVE SUCTION TUBING: Brand: CARDINAL HEALTH

## (undated) NOTE — MR AVS SNAPSHOT
[place]  [address] [facility] [address]  [address] [facility] [address] [phone]               Thank you for choosing us for your health care visit with Cherylyn Sandhoff, MD.  We are glad to serve you and happy to provide you with this sum Commonly known as:  LOPRESSOR                Where to Get Your Medications      These medications were sent to [place], [address] [facility] [address]  [address] , [phone], [address], [place] Healthy Diet and Regular Exercise  [employer] [facility] [employer] for making healthy food choices  -   Enjoy your food, but eat less. Fully enjoy your food when eating. Don’t eat while distracted and slow down. Avoid over sized portions.    Herson Conde

## (undated) NOTE — Clinical Note
I had the pleasure of seeing Mr. Jason Bell in my office today. Please see my attached note.     Carol Hill

## (undated) NOTE — LETTER
10/24/19        Chad Valdez  [address] [place] [address]      Dear Ajay Donovan,    [address] [facility] records indicate that you have outstanding lab work and or testing that was ordered for you and has not yet been completed: Fasting lab work   *fast for at [employer]

## (undated) NOTE — Clinical Note
I had the pleasure of seeing Mr. Minor Dyer in my office today. Please see my attached note.     Austyn Momin

## (undated) NOTE — MR AVS SNAPSHOT
[place]  [address] [facility] [address]  [address] [facility] [id number] [phone]               Thank you for choosing us for your health care visit with Jessenia Flores MD.  We are glad to serve you and happy to provide you with this sum Phone:  [phone]    - hydrochlorothiazide 12.5 MG Tabs  - Pravastatin Sodium 20 MG Tabs            Today's Orders     ALT (SGPT)    Complete by:   May 01, 2017 (Approximate)    Assoc Dx:  Pure hypercholesterolemia [E78.00]           AST (SGOT)    Comp

## (undated) NOTE — LETTER
October 31, 2019    Latanya Alme[address]  [place] [address]      Dear Theodore Barney: The following are the results of your recent tests. Please review the list of test results.   Your result is the value on the left; we have also supplied the ra Neutrophil Absolute Prelim 2.86 1.50 - 7.70 x10 (3) uL    Neutrophil Absolute 2.86 1.50 - 7.70 x10(3) uL    Lymphocyte Absolute 1.61 1.00 - 4.00 x10(3) uL    Monocyte Absolute 0.51 0.10 - 1.00 x10(3) uL    Eosinophil Absolute 0.18 0.00 - 0.70 x10(3) uL

## (undated) NOTE — LETTER
Patient Name: Mark Eason        : 3/30/1965       Medical Record #: [id number]    CONSENT FOR PROCEDURES/SEDATION    Date: 2024       Time: 8:34 AM        1. I authorize the performance upon Mark Eason the following:    _______Image guided Left Lung Biopsy______________    2. I authorize Dr. Houston (and whomever is designated as the doctor’s assistant), to perform the above mentioned procedures.    3. If any unforeseen conditions arise during this procedure calling for additional procedures, operations, or medications (including anesthesia and blood transfusion), I  further request and authorize the doctor to do whatever he/she deems advisable in my interest.    4. I consent to the taking and reproduction of any photographs in the course of this procedure for professional purposes.    5. I consent to the administration of such sedation as may be considered necessary or advisable by the physician responsible for this service, with the exception of  _none, allergy to statins.    6. I have been informed by my doctor of the nature and purpose of this procedure/sedation, possible alternative methods of treatment, risk involved and possible complications.      Signature of Patient:  ___________________________    Signature of person authorized to consent for patient: Relationship to patient:  ___________________________    ___________________    Witness: ____________________     Date: ______________    Provider: ____________________     Date: ______________